# Patient Record
Sex: FEMALE | Race: WHITE | NOT HISPANIC OR LATINO | Employment: OTHER | URBAN - METROPOLITAN AREA
[De-identification: names, ages, dates, MRNs, and addresses within clinical notes are randomized per-mention and may not be internally consistent; named-entity substitution may affect disease eponyms.]

---

## 2023-07-19 PROBLEM — E03.9 ACQUIRED HYPOTHYROIDISM: Chronic | Status: ACTIVE | Noted: 2023-07-19

## 2023-07-19 PROBLEM — Z91.89 AT RISK FOR INJURY RELATED TO HYPOGLYCEMIA: Chronic | Status: ACTIVE | Noted: 2023-07-19

## 2023-07-19 PROBLEM — E78.2 MIXED HYPERLIPIDEMIA: Chronic | Status: ACTIVE | Noted: 2023-07-19

## 2023-07-19 PROBLEM — N20.0 KIDNEY STONE: Chronic | Status: ACTIVE | Noted: 2023-07-19

## 2023-07-19 PROBLEM — E13.9 DIABETES 1.5, MANAGED AS TYPE 1: Status: ACTIVE | Noted: 2023-07-19

## 2023-07-19 PROBLEM — E10.649 TYPE 1 DIABETES MELLITUS WITH HYPOGLYCEMIA AND WITHOUT COMA: Chronic | Status: ACTIVE | Noted: 2023-07-19

## 2023-07-19 PROBLEM — E10.649 TYPE 1 DIABETES MELLITUS WITH HYPOGLYCEMIA AND WITHOUT COMA: Chronic | Status: RESOLVED | Noted: 2023-07-19 | Resolved: 2023-07-19

## 2023-07-19 PROBLEM — J30.2 SEASONAL ALLERGIES: Chronic | Status: ACTIVE | Noted: 2023-07-19

## 2023-07-19 PROBLEM — I10 BENIGN ESSENTIAL HYPERTENSION: Chronic | Status: ACTIVE | Noted: 2023-07-19

## 2023-07-19 PROBLEM — E10.9 DIABETES MELLITUS TYPE 1: Chronic | Status: ACTIVE | Noted: 2023-07-19

## 2023-07-26 ENCOUNTER — LAB (OUTPATIENT)
Dept: LAB | Facility: HOSPITAL | Age: 73
End: 2023-07-26
Payer: MEDICARE

## 2023-07-26 ENCOUNTER — PATIENT ROUNDING (BHMG ONLY) (OUTPATIENT)
Dept: INTERNAL MEDICINE | Facility: CLINIC | Age: 73
End: 2023-07-26
Payer: MEDICARE

## 2023-07-26 DIAGNOSIS — E78.2 MIXED HYPERLIPIDEMIA: Chronic | ICD-10-CM

## 2023-07-26 DIAGNOSIS — E55.9 VITAMIN D DEFICIENCY: ICD-10-CM

## 2023-07-26 DIAGNOSIS — E03.9 ACQUIRED HYPOTHYROIDISM: Chronic | ICD-10-CM

## 2023-07-26 DIAGNOSIS — E13.9 DIABETES 1.5, MANAGED AS TYPE 1: ICD-10-CM

## 2023-07-26 LAB
25(OH)D3 SERPL-MCNC: 40.5 NG/ML (ref 30–100)
ALBUMIN SERPL-MCNC: 4.1 G/DL (ref 3.5–5.2)
ALBUMIN UR-MCNC: 1.5 MG/DL
ALBUMIN/GLOB SERPL: 1.6 G/DL
ALP SERPL-CCNC: 71 U/L (ref 39–117)
ALT SERPL W P-5'-P-CCNC: 18 U/L (ref 1–33)
ANION GAP SERPL CALCULATED.3IONS-SCNC: 9.8 MMOL/L (ref 5–15)
AST SERPL-CCNC: 34 U/L (ref 1–32)
BACTERIA UR QL AUTO: NORMAL /HPF
BASOPHILS # BLD AUTO: 0.04 10*3/MM3 (ref 0–0.2)
BASOPHILS NFR BLD AUTO: 0.6 % (ref 0–1.5)
BILIRUB SERPL-MCNC: 0.5 MG/DL (ref 0–1.2)
BILIRUB UR QL STRIP: NEGATIVE
BUN SERPL-MCNC: 15 MG/DL (ref 8–23)
BUN/CREAT SERPL: 21.1 (ref 7–25)
CALCIUM SPEC-SCNC: 9.8 MG/DL (ref 8.6–10.5)
CHLORIDE SERPL-SCNC: 102 MMOL/L (ref 98–107)
CHOLEST SERPL-MCNC: 168 MG/DL (ref 0–200)
CLARITY UR: ABNORMAL
CO2 SERPL-SCNC: 26.2 MMOL/L (ref 22–29)
COLOR UR: YELLOW
CREAT SERPL-MCNC: 0.71 MG/DL (ref 0.57–1)
CREAT UR-MCNC: 118.8 MG/DL
DEPRECATED RDW RBC AUTO: 43 FL (ref 37–54)
EGFRCR SERPLBLD CKD-EPI 2021: 89.9 ML/MIN/1.73
EOSINOPHIL # BLD AUTO: 0.1 10*3/MM3 (ref 0–0.4)
EOSINOPHIL NFR BLD AUTO: 1.5 % (ref 0.3–6.2)
ERYTHROCYTE [DISTWIDTH] IN BLOOD BY AUTOMATED COUNT: 11.8 % (ref 12.3–15.4)
GLOBULIN UR ELPH-MCNC: 2.5 GM/DL
GLUCOSE SERPL-MCNC: 124 MG/DL (ref 65–99)
GLUCOSE UR STRIP-MCNC: NEGATIVE MG/DL
HBA1C MFR BLD: 5.7 % (ref 4.8–5.6)
HCT VFR BLD AUTO: 40.4 % (ref 34–46.6)
HCYS SERPL-MCNC: 6.8 UMOL/L (ref 0–15)
HDLC SERPL-MCNC: 86 MG/DL (ref 40–60)
HGB BLD-MCNC: 13.5 G/DL (ref 12–15.9)
HGB UR QL STRIP.AUTO: NEGATIVE
HYALINE CASTS UR QL AUTO: NORMAL /LPF
KETONES UR QL STRIP: NEGATIVE
LDLC SERPL CALC-MCNC: 73 MG/DL (ref 0–100)
LDLC/HDLC SERPL: 0.87 {RATIO}
LEUKOCYTE ESTERASE UR QL STRIP.AUTO: NEGATIVE
LYMPHOCYTES # BLD AUTO: 1.82 10*3/MM3 (ref 0.7–3.1)
LYMPHOCYTES NFR BLD AUTO: 26.6 % (ref 19.6–45.3)
MCH RBC QN AUTO: 33.1 PG (ref 26.6–33)
MCHC RBC AUTO-ENTMCNC: 33.4 G/DL (ref 31.5–35.7)
MCV RBC AUTO: 99 FL (ref 79–97)
MICROALBUMIN/CREAT UR: 12.6 MG/G
MONOCYTES # BLD AUTO: 0.55 10*3/MM3 (ref 0.1–0.9)
MONOCYTES NFR BLD AUTO: 8 % (ref 5–12)
NEUTROPHILS NFR BLD AUTO: 4.31 10*3/MM3 (ref 1.7–7)
NEUTROPHILS NFR BLD AUTO: 63 % (ref 42.7–76)
NITRITE UR QL STRIP: NEGATIVE
PH UR STRIP.AUTO: 5.5 [PH] (ref 5–8)
PLATELET # BLD AUTO: 228 10*3/MM3 (ref 140–450)
PMV BLD AUTO: 10.2 FL (ref 6–12)
POTASSIUM SERPL-SCNC: 4.3 MMOL/L (ref 3.5–5.2)
PROT SERPL-MCNC: 6.6 G/DL (ref 6–8.5)
PROT UR QL STRIP: NEGATIVE
RBC # BLD AUTO: 4.08 10*6/MM3 (ref 3.77–5.28)
RBC # UR STRIP: NORMAL /HPF
REF LAB TEST METHOD: NORMAL
SODIUM SERPL-SCNC: 138 MMOL/L (ref 136–145)
SP GR UR STRIP: 1.02 (ref 1–1.03)
SQUAMOUS #/AREA URNS HPF: NORMAL /HPF
T4 FREE SERPL-MCNC: 1.32 NG/DL (ref 0.93–1.7)
TRIGL SERPL-MCNC: 38 MG/DL (ref 0–150)
TSH SERPL DL<=0.05 MIU/L-ACNC: 2.13 UIU/ML (ref 0.27–4.2)
UROBILINOGEN UR QL STRIP: ABNORMAL
VIT B12 BLD-MCNC: 410 PG/ML (ref 211–946)
VLDLC SERPL-MCNC: 9 MG/DL (ref 5–40)
WBC # UR STRIP: NORMAL /HPF
WBC NRBC COR # BLD: 6.84 10*3/MM3 (ref 3.4–10.8)

## 2023-07-26 PROCEDURE — 82607 VITAMIN B-12: CPT

## 2023-07-26 PROCEDURE — 82043 UR ALBUMIN QUANTITATIVE: CPT

## 2023-07-26 PROCEDURE — 83090 ASSAY OF HOMOCYSTEINE: CPT

## 2023-07-26 PROCEDURE — 84443 ASSAY THYROID STIM HORMONE: CPT

## 2023-07-26 PROCEDURE — 85027 COMPLETE CBC AUTOMATED: CPT

## 2023-07-26 PROCEDURE — 80053 COMPREHEN METABOLIC PANEL: CPT

## 2023-07-26 PROCEDURE — 81001 URINALYSIS AUTO W/SCOPE: CPT

## 2023-07-26 PROCEDURE — 82570 ASSAY OF URINE CREATININE: CPT

## 2023-07-26 PROCEDURE — 82306 VITAMIN D 25 HYDROXY: CPT

## 2023-07-26 PROCEDURE — 80061 LIPID PANEL: CPT

## 2023-07-26 PROCEDURE — 84439 ASSAY OF FREE THYROXINE: CPT

## 2023-07-26 PROCEDURE — 83036 HEMOGLOBIN GLYCOSYLATED A1C: CPT

## 2023-07-28 ENCOUNTER — TELEPHONE (OUTPATIENT)
Dept: INTERNAL MEDICINE | Facility: CLINIC | Age: 73
End: 2023-07-28
Payer: MEDICARE

## 2023-07-28 RX ORDER — ACETAMINOPHEN 160 MG
2000 TABLET,DISINTEGRATING ORAL DAILY
Qty: 30 CAPSULE | Refills: 11
Start: 2023-07-28 | End: 2024-07-27

## 2023-07-28 RX ORDER — CYANOCOBALAMIN (VITAMIN B-12) 500 MCG
500 TABLET ORAL DAILY
Qty: 90 TABLET | Refills: 1
Start: 2023-07-28

## 2023-08-28 ENCOUNTER — TRANSCRIBE ORDERS (OUTPATIENT)
Dept: ADMINISTRATIVE | Facility: HOSPITAL | Age: 73
End: 2023-08-28
Payer: MEDICARE

## 2023-08-28 DIAGNOSIS — Z12.31 VISIT FOR SCREENING MAMMOGRAM: Primary | ICD-10-CM

## 2023-10-04 ENCOUNTER — HOSPITAL ENCOUNTER (OUTPATIENT)
Dept: MAMMOGRAPHY | Facility: HOSPITAL | Age: 73
Discharge: HOME | End: 2023-10-04
Payer: MEDICARE

## 2023-10-04 ENCOUNTER — APPOINTMENT (OUTPATIENT)
Dept: OTHER | Facility: HOSPITAL | Age: 73
End: 2023-10-04
Payer: MEDICARE

## 2023-10-04 DIAGNOSIS — Z12.31 ENCOUNTER FOR SCREENING MAMMOGRAM FOR MALIGNANT NEOPLASM OF BREAST: ICD-10-CM

## 2023-10-04 DIAGNOSIS — Z12.31 VISIT FOR SCREENING MAMMOGRAM: ICD-10-CM

## 2023-10-04 PROCEDURE — 77067 SCR MAMMO BI INCL CAD: CPT

## 2023-10-04 PROCEDURE — 77063 BREAST TOMOSYNTHESIS BI: CPT

## 2023-10-20 ENCOUNTER — OFFICE VISIT (OUTPATIENT)
Dept: INTERNAL MEDICINE | Facility: CLINIC | Age: 73
End: 2023-10-20
Payer: MEDICARE

## 2023-10-20 VITALS
HEART RATE: 79 BPM | BODY MASS INDEX: 19.63 KG/M2 | DIASTOLIC BLOOD PRESSURE: 72 MMHG | OXYGEN SATURATION: 100 % | HEIGHT: 65 IN | TEMPERATURE: 97.7 F | WEIGHT: 117.8 LBS | SYSTOLIC BLOOD PRESSURE: 132 MMHG

## 2023-10-20 DIAGNOSIS — Z91.89 AT RISK FOR INJURY RELATED TO HYPOGLYCEMIA: Chronic | ICD-10-CM

## 2023-10-20 DIAGNOSIS — E78.2 MIXED HYPERLIPIDEMIA: Chronic | ICD-10-CM

## 2023-10-20 DIAGNOSIS — I10 BENIGN ESSENTIAL HYPERTENSION: Primary | Chronic | ICD-10-CM

## 2023-10-20 DIAGNOSIS — H90.3 SENSORINEURAL HEARING LOSS (SNHL) OF BOTH EARS: Chronic | ICD-10-CM

## 2023-10-20 DIAGNOSIS — E03.9 ACQUIRED HYPOTHYROIDISM: Chronic | ICD-10-CM

## 2023-10-20 DIAGNOSIS — E13.9 DIABETES 1.5, MANAGED AS TYPE 1: ICD-10-CM

## 2023-10-20 RX ORDER — SIMVASTATIN 40 MG
40 TABLET ORAL NIGHTLY
Qty: 90 TABLET | Refills: 1 | Status: SHIPPED | OUTPATIENT
Start: 2023-10-20

## 2023-10-20 RX ORDER — LISINOPRIL 5 MG/1
5 TABLET ORAL DAILY
Qty: 90 TABLET | Refills: 1 | Status: SHIPPED | OUTPATIENT
Start: 2023-10-20

## 2023-10-20 RX ORDER — LEVOTHYROXINE SODIUM 0.05 MG/1
50 TABLET ORAL DAILY
Qty: 90 TABLET | Refills: 1 | Status: SHIPPED | OUTPATIENT
Start: 2023-10-20

## 2023-10-20 NOTE — ASSESSMENT & PLAN NOTE
She will continue using the insulin pump. Her last A1c was very good at 5.9 percent. She will go next week by the lab to do another A1c. She will continue regular walking and exercise. Continue low-sugar, low-fat diet.

## 2023-10-20 NOTE — PROGRESS NOTES
[facility]     July Sumner  1950   [id number]      Patient Care Team:  Evangelina Quinn MD as PCP - General (Internal Medicine)    Chief Complaint   Patient presents with    Hypertension            HPI  Patient is a 73 y.o. female presents today for a follow-up visit.    Hypertension  The patient's blood pressure today is 172/68 mmHg. She is monitoring her blood pressure at home and it is usually 118/60 mmHg. The highest she has usually seen it is 128 mmHg systolic. She is taking lisinopril 5 mg in the evening. The patient eats a moderate amount of salt and very little sugar. She is under more stress lately. The patient's blood pressure on recheck is 132/72 mmHg.    Hyperlipidemia  The patient is taking simvastatin in the evening for her cholesterol. She denies any side effects with her simvastatin. Her cholesterol was normal in 07/2023. She walks 3 to 4 miles a day at least 6 days a week.    Hypothyroidism  The patient takes levothyroxine in the morning on an empty stomach. Her energy level is pretty good.    Diabetes  The patient's blood glucose are good. She monitors her blood glucose at home. She states that her A1c is slightly elevated as she was having some low readings at night. She has decreased her basal rate at night so her blood glucose does not drop as much at night. She will get up in the middle of the night to check it. Her A1c at her last visit was 5.7 percent. She mentions that her blood glucose elevates for a couple of days after receiving vaccines. She tries to keep her blood glucose between 100 and 70 mg/dL. If it got down to 68 mg/dL, it wakes her up. She does not have a sensor, but checks her blood glucose 8 times a day. She goes to bed at 10:00 PM and sets her alarm to wake her up at 1:30 AM. At that time, she pricks her finger and if it is slightly elevated, she will adjust her basal rate to receive less insulin, then her blood glucose is usually fine in the  morning. She denies low readings first thing in the morning. By 5:00 PM, it starts to elevate. Her endocrinologist told her that her liver occasionally releases insulin at night, which cause it to drop. She adjusts her basal rate from 12:00 PM to 5:00 PM. The patient saw the eye doctor in 04/2023 in [place], [place]. She has an upcoming appointment in 2024. She has an appointment with the endocrinologist on 01/10/2024.    Right foot pain  The patient reports pain on the dorsal aspect of her right foot, but it improved. She does not think she has a history of diabetic neuropathy. She denes injury. She went to a podiatrist and they provided diabetic shoes. She denies tenderness, but reports that she had to stop wearing the shoes as they caused tenderness for 1 week. She has not had a problem since. She wears athletic shoes when she is exercising. She has some inserts that the podiatrist gave, which are helpful. She denies numbness and tingling.    Hearing loss  The patient complains of hearing loss of bilateral ears. Both ears are the same; no ear is worse than the other. She thinks she needs her hearing checked.  is mumbling or she needs a hearing check. She denies trouble hearing on the phone. She notices that if there is a lot of background noise in a restaurant, it is difficult to hear.     Health maintenance  The patient had a mammogram on 10/12/2023; she has not received the results. She has an appointment with the dermatologist in 2024.     Immunizations  The patient has had the influenza vaccine, RSV, and COVID-19 vaccine. She had the pneumonia vaccine in 07/2023. She is due for a tetanus vaccine.       CHRONIC CONDITIONS      Past Medical History:   Diagnosis Date    Allergic 1971    seasonal    Arthritis 2019    It isn't bad.    Cataract 2019    Yearly exam - no surgery needed yet.    Diabetes mellitus 1997    Type 1, cause is autoimmune disease    Heart murmur 1990    Mild mitral prolapse     HL (hearing loss)     Mild    Hypothyroidism 1987    related to diabetes    Kidney stone 2023    first kidney stone    Osteopenia     mild osteopenia - stable    Visual impairment     no sign of diabetic retinopathy       Past Surgical History:   Procedure Laterality Date    COLON SURGERY  2016    Cecal Volvulus -colon, segmental resection, terminal ileum & cecum    COLONOSCOPY  6/15/2022    No polyps-  said I didn't need another one because of my age (unless I have problems)    KNEE SURGERY Left     bony growth removed-benign    TUBAL ABDOMINAL LIGATION         Family History   Problem Relation Age of Onset    Breast cancer Mother 52    Arthritis Mother         age related    Cancer Mother         Breast cancer at age 50    Mental illness Mother         Alzheimer's disease noticeable when she was in her late 60s    Vision loss Mother         cataracts late in life    Alzheimer's disease Mother     Alcohol abuse Father         alcoholic    Cancer Father         Lung cancer - smoker    Heart disease Father     Hyperlipidemia Father         smoker, over weight    Other Brother         vitiligo    Other Daughter         vitiligo    Hypothyroidism Daughter     Thyroid disease Daughter         Hyperthyroidism    Hyperthyroidism Daughter     Birth defects Son          at birth - diaphragmatic hernia    Diabetes Paternal Grandmother         Type 2 diabetic late in life    Ovarian cancer Neg Hx        Social History     Socioeconomic History    Marital status:    Tobacco Use    Smoking status: Never    Smokeless tobacco: Never   Substance and Sexual Activity    Alcohol use: Yes     Alcohol/week: 7.0 standard drinks of alcohol     Types: 7 Glasses of wine per week     Comment: I have a glass of wine before dinner most nights.    Drug use: Never    Sexual activity: Never       No Known Allergies    Review of Systems:   The appropriate review of systems is included in the  "HPI.    Vital Signs  Vitals:    10/20/23 1618 10/20/23 1642   BP: 172/68 132/72   BP Location: Left arm Left arm   Patient Position: Sitting Sitting   Cuff Size: Adult    Pulse: 79    Temp: 97.7 °F (36.5 °C)    TempSrc: Infrared    SpO2: 100%    Weight: 53.4 kg (117 lb 12.8 oz)    Height: 165.5 cm (65.16\")    PainSc: 0-No pain      Body mass index is 19.51 kg/m².  BMI is within normal parameters. No other follow-up for BMI required.        Current Outpatient Medications:     Calcium Carbonate-Vitamin D 600-5 MG-MCG tablet, Take 1 capsule by mouth Daily., Disp: , Rfl:     Cholecalciferol (Vitamin D3) 50 MCG (2000 UT) capsule, Take 1 capsule by mouth Daily., Disp: 30 capsule, Rfl: 11    cyanocobalamin (VITAMIN B-12) 500 MCG tablet, Take 1 tablet by mouth Daily., Disp: 90 tablet, Rfl: 1    fluticasone (FLONASE) 50 MCG/ACT nasal spray, 2 sprays into the nostril(s) as directed by provider Daily As Needed for Rhinitis (Seasonal allergies)., Disp: , Rfl:     folic acid (FOLVITE) 400 MCG tablet, Take 1 tablet by mouth Daily., Disp: , Rfl:     Glucagon (Baqsimi Two Pack) 3 MG/DOSE powder, 3 mg into the nostril(s) as directed by provider Daily As Needed (hypoglycemia)., Disp: 2 each, Rfl: 5    glucose blood (Accu-Chek Guide) test strip, Checks 7-8 times per day. Uses Contour strips, Disp: 7 each, Rfl: 5    ibuprofen (ADVIL,MOTRIN) 800 MG tablet, Take 1 tablet by mouth Every 8 (Eight) Hours As Needed., Disp: , Rfl:     insulin regular (HumuLIN R) 500 UNIT/ML patient supplied pump, Inject  under the skin into the appropriate area as directed., Disp: , Rfl:     levothyroxine (SYNTHROID, LEVOTHROID) 50 MCG tablet, Take 1 tablet by mouth Daily., Disp: 90 tablet, Rfl: 1    lisinopril (PRINIVIL,ZESTRIL) 5 MG tablet, Take 1 tablet by mouth Daily., Disp: 90 tablet, Rfl: 1    NON FORMULARY, Metronic MiniMed 730g diabetic supplies, Disp: , Rfl:     NovoLOG 100 UNIT/ML injection, Inject 30 Units under the skin into the appropriate area " as directed Daily. Up to 30 units daily, Disp: 3 each, Rfl: 11    simvastatin (ZOCOR) 40 MG tablet, Take 1 tablet by mouth Every Night., Disp: 90 tablet, Rfl: 1    Physical Exam:    Physical Exam  Vitals and nursing note reviewed.   Constitutional:       Appearance: Normal appearance. She is well-developed.   HENT:      Head: Normocephalic.   Eyes:      Conjunctiva/sclera: Conjunctivae normal.      Pupils: Pupils are equal, round, and reactive to light.   Neck:      Thyroid: No thyromegaly.   Cardiovascular:      Rate and Rhythm: Normal rate and regular rhythm.      Heart sounds: Normal heart sounds.      Comments: No edema.  Pulmonary:      Effort: Pulmonary effort is normal.      Breath sounds: Normal breath sounds. No wheezing.   Musculoskeletal:         General: Normal range of motion.      Cervical back: Normal range of motion and neck supple.   Lymphadenopathy:      Cervical: No cervical adenopathy.   Skin:     General: Skin is warm and dry.   Neurological:      Mental Status: She is alert and oriented to person, place, and time.   Psychiatric:         Thought Content: Thought content normal.        ACE III MINI        Results Review:    None    CMP:  Lab Results   Component Value Date    BUN 15 07/26/2023    CREATININE 0.71 07/26/2023    BCR 21.1 07/26/2023     07/26/2023    K 4.3 07/26/2023    CO2 26.2 07/26/2023    CALCIUM 9.8 07/26/2023    ALBUMIN 4.1 07/26/2023    BILITOT 0.5 07/26/2023    ALKPHOS 71 07/26/2023    AST 34 (H) 07/26/2023    ALT 18 07/26/2023     HbA1c:  Lab Results   Component Value Date    HGBA1C 5.50 10/27/2023    HGBA1C 5.70 (H) 07/26/2023     Microalbumin:  Lab Results   Component Value Date    MICROALBUR 1.5 07/26/2023     Lipid Panel  Lab Results   Component Value Date    CHOL 168 07/26/2023    TRIG 38 07/26/2023    HDL 86 (H) 07/26/2023    LDL 73 07/26/2023    AST 34 (H) 07/26/2023    ALT 18 07/26/2023       Medication Review: Medications reviewed and noted  Patient  Instructions   Problem List Items Addressed This Visit          Advance Directives and General Issues    At risk for injury related to hypoglycemia (Chronic)       Cardiac and Vasculature    Mixed hyperlipidemia (Chronic)    Overview     Taking simvastatin every evening.         Current Assessment & Plan     She will continue simvastatin every evening. Continue low-fat diet and regular exercise.         Relevant Medications    simvastatin (ZOCOR) 40 MG tablet    Benign essential hypertension - Primary (Chronic)    Current Assessment & Plan     She will continue to decrease salt in the diet. She will continue lisinopril 5 mg tablet daily. Continue regular walking and exercise.         Relevant Medications    lisinopril (PRINIVIL,ZESTRIL) 5 MG tablet       ENT    Sensorineural hearing loss (SNHL) of both ears (Chronic)    Overview     [facility]         Current Assessment & Plan     I gave her a prescription to go to [facility] for hearing testing.            Endocrine and Metabolic    Acquired hypothyroidism (Chronic)    Current Assessment & Plan     She will continue current dose of levothyroxine daily.         Relevant Medications    levothyroxine (SYNTHROID, LEVOTHROID) 50 MCG tablet    Diabetes 1.5, managed as type 1    Current Assessment & Plan     She will continue using the insulin pump. Her last A1c was very good at 5.9 percent. She will go next week by the lab to do another A1c. She will continue regular walking and exercise. Continue low-sugar, low-fat diet.         Relevant Medications    insulin regular (HumuLIN R) 500 UNIT/ML patient supplied pump    glucose blood (Accu-Chek Guide) test strip    NovoLOG 100 UNIT/ML injection    Glucagon (Baqsimi Two Pack) 3 MG/DOSE powder    Other Relevant Orders    Hemoglobin A1c (Completed)          Diagnosis Plan   1. Benign essential hypertension        2. Mixed hyperlipidemia        3. Diabetes 1.5, managed as type 1  Hemoglobin  A1c      4. At risk for injury related to hypoglycemia        5. Acquired hypothyroidism        6. Sensorineural hearing loss (SNHL) of both ears                Follow up: She will come back in 01/2024 for Medicare annual wellness.    Plan of care reviewed with patient at the conclusion of today's visit. Education was provided regarding diagnosis, management, and any prescribed or recommended OTC medications.Patient verbalizes understanding of and agreement with management plan.         Transcribed from ambient dictation for Evangelina Quinn MD by Hui Zendejas.  10/20/23   19:41 EDT    Patient or patient representative verbalized consent to the visit recording.  I have personally performed the services described in this document as transcribed by the above individual, and it is both accurate and complete.

## 2023-10-20 NOTE — ASSESSMENT & PLAN NOTE
She will continue to decrease salt in the diet. She will continue lisinopril 5 mg tablet daily. Continue regular walking and exercise.

## 2023-10-27 ENCOUNTER — LAB (OUTPATIENT)
Dept: LAB | Facility: HOSPITAL | Age: 73
End: 2023-10-27
Payer: MEDICARE

## 2023-10-27 DIAGNOSIS — E13.9 DIABETES 1.5, MANAGED AS TYPE 1: ICD-10-CM

## 2023-10-27 LAB — HBA1C MFR BLD: 5.5 % (ref 4.8–5.6)

## 2023-10-27 PROCEDURE — 83036 HEMOGLOBIN GLYCOSYLATED A1C: CPT

## 2023-10-29 NOTE — PATIENT INSTRUCTIONS
Patient Instructions  Problem List Items Addressed This Visit          Advance Directives and General Issues    At risk for injury related to hypoglycemia (Chronic)       Cardiac and Vasculature    Mixed hyperlipidemia (Chronic)    Overview     Taking simvastatin every evening.         Current Assessment & Plan     She will continue simvastatin every evening. Continue low-fat diet and regular exercise.         Relevant Medications    simvastatin (ZOCOR) 40 MG tablet    Benign essential hypertension - Primary (Chronic)    Current Assessment & Plan     She will continue to decrease salt in the diet. She will continue lisinopril 5 mg tablet daily. Continue regular walking and exercise.         Relevant Medications    lisinopril (PRINIVIL,ZESTRIL) 5 MG tablet       ENT    Sensorineural hearing loss (SNHL) of both ears (Chronic)    Overview     [facility]         Current Assessment & Plan     I gave her a prescription to go to [facility] for hearing testing.            Endocrine and Metabolic    Acquired hypothyroidism (Chronic)    Current Assessment & Plan     She will continue current dose of levothyroxine daily.         Relevant Medications    levothyroxine (SYNTHROID, LEVOTHROID) 50 MCG tablet    Diabetes 1.5, managed as type 1    Current Assessment & Plan     She will continue using the insulin pump. Her last A1c was very good at 5.9 percent. She will go next week by the lab to do another A1c. She will continue regular walking and exercise. Continue low-sugar, low-fat diet.         Relevant Medications    insulin regular (HumuLIN R) 500 UNIT/ML patient supplied pump    glucose blood (Accu-Chek Guide) test strip    NovoLOG 100 UNIT/ML injection    Glucagon (Baqsimi Two Pack) 3 MG/DOSE powder    Other Relevant Orders    Hemoglobin A1c (Completed)

## 2024-01-10 ENCOUNTER — OFFICE VISIT (OUTPATIENT)
Dept: ENDOCRINOLOGY | Facility: CLINIC | Age: 74
End: 2024-01-10
Payer: MEDICARE

## 2024-01-10 VITALS
HEIGHT: 65 IN | HEART RATE: 66 BPM | OXYGEN SATURATION: 98 % | DIASTOLIC BLOOD PRESSURE: 66 MMHG | SYSTOLIC BLOOD PRESSURE: 134 MMHG | WEIGHT: 117 LBS | BODY MASS INDEX: 19.49 KG/M2

## 2024-01-10 DIAGNOSIS — E03.8 HYPOTHYROIDISM DUE TO HASHIMOTO'S THYROIDITIS: ICD-10-CM

## 2024-01-10 DIAGNOSIS — E13.9 DIABETES 1.5, MANAGED AS TYPE 1: Primary | ICD-10-CM

## 2024-01-10 DIAGNOSIS — E06.3 HYPOTHYROIDISM DUE TO HASHIMOTO'S THYROIDITIS: ICD-10-CM

## 2024-01-10 LAB
EXPIRATION DATE: NORMAL
GLUCOSE BLDC GLUCOMTR-MCNC: 178 MG/DL (ref 70–130)
HBA1C MFR BLD: 5.5 % (ref 4.5–5.7)
Lab: NORMAL

## 2024-01-10 PROCEDURE — 82947 ASSAY GLUCOSE BLOOD QUANT: CPT | Performed by: INTERNAL MEDICINE

## 2024-01-10 PROCEDURE — 3044F HG A1C LEVEL LT 7.0%: CPT | Performed by: INTERNAL MEDICINE

## 2024-01-10 PROCEDURE — 99204 OFFICE O/P NEW MOD 45 MIN: CPT | Performed by: INTERNAL MEDICINE

## 2024-01-10 PROCEDURE — 3078F DIAST BP <80 MM HG: CPT | Performed by: INTERNAL MEDICINE

## 2024-01-10 PROCEDURE — 83036 HEMOGLOBIN GLYCOSYLATED A1C: CPT | Performed by: INTERNAL MEDICINE

## 2024-01-10 PROCEDURE — 3075F SYST BP GE 130 - 139MM HG: CPT | Performed by: INTERNAL MEDICINE

## 2024-01-10 RX ORDER — BLOOD SUGAR DIAGNOSTIC
STRIP MISCELLANEOUS
Qty: 700 EACH | Refills: 3 | Status: SHIPPED | OUTPATIENT
Start: 2024-01-10

## 2024-01-10 RX ORDER — INSULIN ASPART 100 [IU]/ML
INJECTION, SOLUTION INTRAVENOUS; SUBCUTANEOUS
Qty: 4 EACH | Refills: 3 | Status: SHIPPED | OUTPATIENT
Start: 2024-01-10

## 2024-01-10 RX ORDER — PHENOL 1.4 %
600 AEROSOL, SPRAY (ML) MUCOUS MEMBRANE DAILY
COMMUNITY

## 2024-01-10 RX ORDER — LANCETS
8 EACH MISCELLANEOUS DAILY
COMMUNITY

## 2024-01-10 NOTE — PROGRESS NOTES
Chief Complaint   Patient presents with    Diabetes     New Consult        HPI:   July Sumner is a 73 y.o.female sent in consultation by Evangelina Quinn MD for further evaluation and management of her diabetes and hypothyroidism. Her history is as follows:    1) Type 1.5 DM (treated as type 1) with no known associated complications at this time:   - Diagnosed with DM at age 47 (1997) after presenting with weight loss. Found to have CHRISTOPHER.  - started insulin pump in approximately 2008.   - Current Pump: Medtronic 770G w/o CGM. Replacement pump in 05/2023  - Previously managed by Endocrinology in [place], [place] (Dr. Parth Armando)    Current Insulin Pump Settings: (Novolog insulin)  Basal Rates: MN 0.500 u/hr, 6AM 0.650, 9PM 0.550 (total 14.400 units)  I:C Ratio: MN 1 unit : 15 gm  ISF:MN 1 unit : 50 > 100   BG Target:   Active Insulin Time: 4 hours    Glucometer/ Insulin Pump Review:  -  does not use carb wizard, manually enters insulin for food  - majority of pre-meal BGs < 130, occasional post-prandial hypoglycemia  Avg glucose: 118 +/- 41    Avg TDD: 28 units +/- 1.8 units  Avg Basal: 14.3 units (51%)  Avg Bolus: 13.7 units (49%)    DM Health Maintenance:  Ophtho: Last Visit (04/2023) in [place] - no retinopathy  Podiatry: No recent visit  Monofilament / Foot exam: DUE  Lipids: (01/2024) TChol 178, TG 40, HDL 96, LDL 73 - on simvastatin 40 mg q evening  Urine Microalb/Cr ratio: (01/2024) normal  - on lisinopril 5 mg for HTN  CBC: (01/2024) Hgb 13.5  CMP: (01/2024) Cr 0.64, eGFR 92.9, ALT 28, AST 38   Vit B12: (01/2024) 1,311 - on vit B12 500 mcg daily    2) hypothyroidism due to hashimoto's thyroiditis:   - diagnosed in approx 2001    Current Dose: levothyroxine 50 mcg   - Takes in AM on an empty stomach  - no interfering factors  - denies missed doses  - is not on a high dose biotin supplement    Lab Results   Component Value Date    TSH 2.910 01/23/2024     Review of Systems   Constitutional: Negative.     HENT: Negative.  Positive for rhinorrhea, sinus pressure and sneezing.    Eyes: Negative.    Respiratory: Negative.  Positive for cough.    Cardiovascular: Negative.    Gastrointestinal: Negative.    Endocrine: Negative.    Genitourinary: Negative.    Musculoskeletal: Negative.  Positive for arthralgias (feet, hands).   Skin: Negative.    Allergic/Immunologic: Negative.    Neurological: Negative.    Hematological: Negative.    Psychiatric/Behavioral: Negative.       Past Medical History:   Diagnosis Date    Allergic 1971    seasonal    Arthritis 2019    It isn't bad.    Cataract 2019    Yearly exam - no surgery needed yet.    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  Past Surgical History:   Procedure Laterality Date    COLON SURGERY  11/11/2016    Cecal Volvulus -colon, segmental resection, terminal ileum & cecum    COLONOSCOPY  6/15/2022    No polyps-  said I didn't need another one because of my age (unless I have problems)    KNEE SURGERY Left     bony growth removed-benign    TUBAL ABDOMINAL LIGATION  1982     Social History     Tobacco Use    Smoking status: Never    Smokeless tobacco: Never   Vaping Use    Vaping Use:  Never used   Substance Use Topics    Alcohol use: Yes     Alcohol/week: 7.0 standard drinks of alcohol     Types: 7 Glasses of wine per week     Comment: I have a glass of wine before dinner most nights.    Drug use: Never     Outpatient Medications Prior to Visit   Medication Sig Dispense Refill    calcium carbonate (Calcium 600) 600 MG tablet Take 1 tablet by mouth Daily.      Cholecalciferol (Vitamin D3) 50 MCG (2000 UT) capsule Take 1 capsule by mouth Daily. 30 capsule 11    cyanocobalamin (VITAMIN B-12) 500 MCG tablet Take 1 tablet by mouth Daily. 90 tablet 1    fluticasone (FLONASE) 50 MCG/ACT nasal spray 2 sprays into the nostril(s) as directed by provider Daily As Needed for Rhinitis (Seasonal allergies).      folic acid (FOLVITE) 400 MCG tablet Take 1 tablet by mouth Daily.      Glucagon (Baqsimi Two Pack) 3 MG/DOSE powder 3 mg into the nostril(s) as directed by provider Daily As Needed (hypoglycemia). 2 each 5    ibuprofen (ADVIL,MOTRIN) 800 MG tablet Take 1 tablet by mouth Every 8 (Eight) Hours As Needed.      levothyroxine (SYNTHROID, LEVOTHROID) 50 MCG tablet Take 1 tablet by mouth Daily. 90 tablet 1    lisinopril (PRINIVIL,ZESTRIL) 5 MG tablet Take 1 tablet by mouth Daily. 90 tablet 1    Microlet Lancets misc Use 8 each Daily.      NON FORMULARY Metronic MiniMed 730g diabetic supplies      simvastatin (ZOCOR) 40 MG tablet Take 1 tablet by mouth Every Night. 90 tablet 1    glucose blood (Accu-Chek Guide) test strip Checks 7-8 times per day. Uses Contour strips 7 each 5    glucose blood (Contour Next Test) test strip 8 each by Other route Daily. Using both accuchek and contour      NovoLOG 100 UNIT/ML injection Inject 30 Units under the skin into the appropriate area as directed Daily. Up to 30 units daily 3 each 11    Calcium Carbonate-Vitamin D 600-5 MG-MCG tablet Take 1 capsule by mouth Daily.      insulin regular (HumuLIN R) 500 UNIT/ML patient supplied pump Inject  under the skin into the  "appropriate area as directed.       No facility-administered medications prior to visit.     No Known Allergies    /66   Pulse 66   Ht 165.1 cm (65\")   Wt 53.1 kg (117 lb)   SpO2 98%   BMI 19.47 kg/m²   Physical Exam  Vitals reviewed.   Constitutional:       General: She is not in acute distress.     Appearance: She is well-developed. She is not diaphoretic.   HENT:      Head: Normocephalic.   Eyes:      Conjunctiva/sclera: Conjunctivae normal.      Pupils: Pupils are equal, round, and reactive to light.   Neck:      Thyroid: No thyromegaly.      Trachea: No tracheal deviation.      Comments: No palpable thyroid nodules    Cardiovascular:      Rate and Rhythm: Normal rate and regular rhythm.      Heart sounds: Normal heart sounds. No murmur heard.  Pulmonary:      Effort: Pulmonary effort is normal. No respiratory distress.      Breath sounds: Normal breath sounds.   Abdominal:      General: Bowel sounds are normal.      Palpations: Abdomen is soft. There is no mass.      Tenderness: There is no abdominal tenderness.      Comments: No scarring at catheter insertion sites     Lymphadenopathy:      Cervical: No cervical adenopathy.   Skin:     General: Skin is warm and dry.      Findings: No erythema.   Neurological:      Mental Status: She is alert and oriented to person, place, and time.      Cranial Nerves: No cranial nerve deficit.   Psychiatric:         Behavior: Behavior normal.         LABS/IMAGING: outside records reviewed and summarized in HPI  Results for orders placed or performed in visit on 01/10/24   POC Glycosylated Hemoglobin (Hb A1C)    Specimen: Blood   Result Value Ref Range    Hemoglobin A1C 5.5 4.5 - 5.7 %    Lot Number [id number]     Expiration Date 7/30/25    POC Glucose, Blood    Specimen: Blood   Result Value Ref Range    Glucose 178 (A) 70 - 130 mg/dL     Lab Results   Component Value Date    [id number] 1,311 (H) 01/23/2024     Lab Results   Component Value Date    WBC 7.17 " 01/23/2024    HGB 13.5 01/23/2024    HCT 40.3 01/23/2024    MCV 96.4 01/23/2024     01/23/2024     Lab Results   Component Value Date    GLUCOSE 127 (H) 01/23/2024    BUN 15 01/23/2024    CREATININE 0.64 01/23/2024    EGFR 92.9 01/23/2024    BCR 23.4 01/23/2024    K 4.3 01/23/2024    CO2 26.9 01/23/2024    CALCIUM 9.8 01/23/2024    ALBUMIN 4.4 01/23/2024    BILITOT 0.4 01/23/2024    AST 38 (H) 01/23/2024    ALT 28 01/23/2024     Lab Results   Component Value Date    TSH 2.910 01/23/2024     Lab Results   Component Value Date    MICROALBUR <1.2 01/23/2024     Lab Results   Component Value Date    CHOL 178 01/23/2024    TRIG 40 01/23/2024    HDL 96 (H) 01/23/2024    LDL 73 01/23/2024       ASSESSMENT/PLAN:    1) Type 1.5 DM (treated as type 1) with no known associated complications at this time: controlled, A1C% 5.5 today.  - Reviewed current insulin pump settings with patient. Discussed that advantages of using the carb wizard function of the pump to avoid some of the hypoglycemia she experiences instead of manually bolusing insulin for all foods consumed. Also reviewed some of the advantages of CGM devices and some of the new hybrid closed loop pumps available. Pt will not be eligible for a new pump through DME until 2028. She would be eligible for an omnipod 5 pump through her pharmacy benefits. Pt given brochures to review.     For now will continue her current insulin pump settings:   Basal Rates: MN 0.500 u/hr, 6AM 0.650, 9PM 0.550 (total 14.400 units)  I:C Ratio: MN 1 unit : 15 gm  ISF:MN 1 unit : 50 > 100   BG Target:   Active Insulin Time: 4 hours    2) hypothyroidism due to hashimoto's thyroiditis: controlled  TSH collected 01/23/24 was normal at 2.910  - continue levothyroxine 50 mcg qAM  - Reviewed proper thyroid hormone administration, and factors to avoid that decrease medication potency and medication absorption.     RTC 05/20/2024     Electronically Signed: Suzie Ledbetter MD

## 2024-01-23 ENCOUNTER — LAB (OUTPATIENT)
Dept: LAB | Facility: HOSPITAL | Age: 74
End: 2024-01-23
Payer: MEDICARE

## 2024-01-23 DIAGNOSIS — E55.9 VITAMIN D DEFICIENCY: ICD-10-CM

## 2024-01-23 DIAGNOSIS — E13.9 DIABETES 1.5, MANAGED AS TYPE 1: ICD-10-CM

## 2024-01-23 DIAGNOSIS — E06.3 HYPOTHYROIDISM DUE TO HASHIMOTO'S THYROIDITIS: ICD-10-CM

## 2024-01-23 DIAGNOSIS — E03.8 HYPOTHYROIDISM DUE TO HASHIMOTO'S THYROIDITIS: ICD-10-CM

## 2024-01-23 LAB
25(OH)D3 SERPL-MCNC: 53.1 NG/ML (ref 30–100)
ALBUMIN SERPL-MCNC: 4.4 G/DL (ref 3.5–5.2)
ALBUMIN UR-MCNC: <1.2 MG/DL
ALBUMIN/GLOB SERPL: 1.6 G/DL
ALP SERPL-CCNC: 74 U/L (ref 39–117)
ALT SERPL W P-5'-P-CCNC: 28 U/L (ref 1–33)
ANION GAP SERPL CALCULATED.3IONS-SCNC: 11.1 MMOL/L (ref 5–15)
AST SERPL-CCNC: 38 U/L (ref 1–32)
BILIRUB SERPL-MCNC: 0.4 MG/DL (ref 0–1.2)
BUN SERPL-MCNC: 15 MG/DL (ref 8–23)
BUN/CREAT SERPL: 23.4 (ref 7–25)
CALCIUM SPEC-SCNC: 9.8 MG/DL (ref 8.6–10.5)
CHLORIDE SERPL-SCNC: 103 MMOL/L (ref 98–107)
CHOLEST SERPL-MCNC: 178 MG/DL (ref 0–200)
CO2 SERPL-SCNC: 26.9 MMOL/L (ref 22–29)
CREAT SERPL-MCNC: 0.64 MG/DL (ref 0.57–1)
CREAT UR-MCNC: 87.8 MG/DL
DEPRECATED RDW RBC AUTO: 40.9 FL (ref 37–54)
EGFRCR SERPLBLD CKD-EPI 2021: 92.9 ML/MIN/1.73
ERYTHROCYTE [DISTWIDTH] IN BLOOD BY AUTOMATED COUNT: 11.8 % (ref 12.3–15.4)
GLOBULIN UR ELPH-MCNC: 2.7 GM/DL
GLUCOSE SERPL-MCNC: 127 MG/DL (ref 65–99)
HCT VFR BLD AUTO: 40.3 % (ref 34–46.6)
HDLC SERPL-MCNC: 96 MG/DL (ref 40–60)
HGB BLD-MCNC: 13.5 G/DL (ref 12–15.9)
LDLC SERPL CALC-MCNC: 73 MG/DL (ref 0–100)
LDLC/HDLC SERPL: 0.77 {RATIO}
MCH RBC QN AUTO: 32.3 PG (ref 26.6–33)
MCHC RBC AUTO-ENTMCNC: 33.5 G/DL (ref 31.5–35.7)
MCV RBC AUTO: 96.4 FL (ref 79–97)
MICROALBUMIN/CREAT UR: NORMAL MG/G{CREAT}
PLATELET # BLD AUTO: 249 10*3/MM3 (ref 140–450)
PMV BLD AUTO: 10.3 FL (ref 6–12)
POTASSIUM SERPL-SCNC: 4.3 MMOL/L (ref 3.5–5.2)
PROT SERPL-MCNC: 7.1 G/DL (ref 6–8.5)
RBC # BLD AUTO: 4.18 10*6/MM3 (ref 3.77–5.28)
SODIUM SERPL-SCNC: 141 MMOL/L (ref 136–145)
TRIGL SERPL-MCNC: 40 MG/DL (ref 0–150)
TSH SERPL DL<=0.05 MIU/L-ACNC: 2.91 UIU/ML (ref 0.27–4.2)
VIT B12 BLD-MCNC: 1311 PG/ML (ref 211–946)
VLDLC SERPL-MCNC: 9 MG/DL (ref 5–40)
WBC NRBC COR # BLD AUTO: 7.17 10*3/MM3 (ref 3.4–10.8)

## 2024-01-23 PROCEDURE — 82570 ASSAY OF URINE CREATININE: CPT

## 2024-01-23 PROCEDURE — 80061 LIPID PANEL: CPT

## 2024-01-23 PROCEDURE — 84443 ASSAY THYROID STIM HORMONE: CPT

## 2024-01-23 PROCEDURE — 82607 VITAMIN B-12: CPT

## 2024-01-23 PROCEDURE — 82043 UR ALBUMIN QUANTITATIVE: CPT

## 2024-01-23 PROCEDURE — 80053 COMPREHEN METABOLIC PANEL: CPT

## 2024-01-23 PROCEDURE — 85027 COMPLETE CBC AUTOMATED: CPT

## 2024-01-23 PROCEDURE — 82306 VITAMIN D 25 HYDROXY: CPT

## 2024-02-07 ENCOUNTER — OFFICE VISIT (OUTPATIENT)
Dept: INTERNAL MEDICINE | Facility: CLINIC | Age: 74
End: 2024-02-07
Payer: MEDICARE

## 2024-02-07 VITALS
HEIGHT: 64 IN | SYSTOLIC BLOOD PRESSURE: 146 MMHG | TEMPERATURE: 97.5 F | WEIGHT: 119.2 LBS | DIASTOLIC BLOOD PRESSURE: 76 MMHG | HEART RATE: 76 BPM | BODY MASS INDEX: 20.35 KG/M2 | OXYGEN SATURATION: 98 %

## 2024-02-07 DIAGNOSIS — E78.2 MIXED HYPERLIPIDEMIA: Chronic | ICD-10-CM

## 2024-02-07 DIAGNOSIS — Z00.00 MEDICARE ANNUAL WELLNESS VISIT, SUBSEQUENT: Primary | ICD-10-CM

## 2024-02-07 DIAGNOSIS — E13.9 DIABETES 1.5, MANAGED AS TYPE 1: ICD-10-CM

## 2024-02-07 DIAGNOSIS — I45.10 INCOMPLETE RIGHT BUNDLE BRANCH BLOCK: Chronic | ICD-10-CM

## 2024-02-07 DIAGNOSIS — E03.8 HYPOTHYROIDISM DUE TO HASHIMOTO'S THYROIDITIS: ICD-10-CM

## 2024-02-07 DIAGNOSIS — E06.3 HYPOTHYROIDISM DUE TO HASHIMOTO'S THYROIDITIS: ICD-10-CM

## 2024-02-07 DIAGNOSIS — N95.9 MENOPAUSAL AND POSTMENOPAUSAL DISORDER: ICD-10-CM

## 2024-02-07 DIAGNOSIS — I10 BENIGN ESSENTIAL HYPERTENSION: Chronic | ICD-10-CM

## 2024-02-07 RX ORDER — LEVOTHYROXINE SODIUM 0.05 MG/1
50 TABLET ORAL DAILY
Qty: 90 TABLET | Refills: 1 | Status: SHIPPED | OUTPATIENT
Start: 2024-02-07

## 2024-02-07 RX ORDER — CYANOCOBALAMIN (VITAMIN B-12) 500 MCG
TABLET ORAL
Start: 2024-02-07

## 2024-02-07 RX ORDER — LISINOPRIL 5 MG/1
5 TABLET ORAL DAILY
Qty: 90 TABLET | Refills: 1 | Status: SHIPPED | OUTPATIENT
Start: 2024-02-07

## 2024-02-07 RX ORDER — SIMVASTATIN 40 MG
40 TABLET ORAL NIGHTLY
Qty: 90 TABLET | Refills: 1 | Status: SHIPPED | OUTPATIENT
Start: 2024-02-07

## 2024-02-07 NOTE — ASSESSMENT & PLAN NOTE
She will continue current treatment and close follow-up with Dr. Ledbetter. She will continue a low-sugar, low-carbohydrate diet, regular exercise, and walking.

## 2024-02-07 NOTE — PROGRESS NOTES
The ABCs of the Annual Wellness Visit  Initial Medicare Wellness Visit    Chief Complaint   Patient presents with    Medicare Wellness-subsequent     Pt wanted to discuss B12    Hypertension    Med Refill       Subjective   History of Present Illness:  July Sumner is a 73 y.o. female who presents for an Initial Medicare Wellness Visit.    Wellness  The patient has not had any falls. She got all 3 of her words correct.     Elevated blood pressure   Her blood pressure is a little elevated today at 146/70 mmHg. She does check her blood pressure at home. It is usually 118 to 120/60 mmHg at home. The patient will continue on the same dose of lisinopril.     Incomplete right bundle branch block  Her EKG is fine. There is an incomplete right bundle branch block. The patient states she was told a long time ago that she has mild mitral valve prolapse.     Diabetes   She saw Dr. Ledbetter who ordered some labs. The patient will see her in 07/2024. Her blood glucose at the time of draw was 127 mg/dL. Her last hemoglobin A1c was 5.5%. She wears a pump and gets up a couple of times per night to check her blood glucose. The patient does not like the continuous glucose monitors. She occasionally sees low blood glucose, and Dr. Ledbetter suggested she decrease her basal rate when she takes a long walk. The patient denies any side effects with any of her medications.     Elevated B12 level   The patient states she was started on a B12 supplement at her last visit here. Her most recent labs show an elevated B12 level at around 1100. She is reassured an elevated B12 is not worrisome. The patient may decrease her dose to 500 mg on Monday, Wednesday, and Friday    Cholesterol  Her LDL cholesterol is very good at 73 mg/dL and the goal is around 75 mg/dL for people with diabetes. Her HDL is excellent at 96 mg/dL.  She walks for exercise. The patient has joined the [facility].     Vitamin D deficiency   Her vitamin D is good at 53 ng/mL, and she will  continue her same dose.    Weight loss  She has gained back a little weight. The patient weighed 113 pounds in 07/2023. She states she is eating better now and has a good appetite.    Review of labs   White and red blood cells are normal.   Platelets are normal.  The red cell width is a little low, but not a problem.   Creatinine is excellent.   Kidney function is within normal limits.  AST is a little over the normal as it was before.   Other liver enzymes are perfect.   She is not losing too much protein in the urine.  Thyroid is very good.     Foot pain  She states she had some foot pain but stretching before she walks has helped that. The patient has not had pain recently. She denies numbness or tingling. The patient denies swelling in her feet.    Hearing loss   She is trying some new hearing aids. The patient thinks they help. She was told she has moderate hearing loss.    Health maintenance   She will see Dr. Morales, her eye doctor, in 04/2024.  The patient saw the dermatologist and has an appointment to see her again in 1 year.  She gets an annual mammogram which she has had already.   The patient does not need a Pap smear anymore.   She was told she does not need a colonoscopy anymore.  She had no polyps last time.   DEXA scan done in 2022 showed mild osteopenia, which had not changed. She takes calcium and will continue walking. An order is placed for a DEXA scan in 08/2024.  The patient will call a week before she has her other labs drawn in 07/2024 so we can place an order.      CHRONIC CONDITIONS:    HEALTH RISK ASSESSMENT    Recent Hospitalizations:  She was not admitted to the hospital during the last year.       Current Medical Providers:  Patient Care Team:  Evangelina Quinn MD as PCP - General (Internal Medicine)  Suzie Ledbetter MD as Consulting Physician (Endocrinology)  Zuhair Morales MD as Consulting Physician (Ophthalmology)    Smoking Status:  Social History     Tobacco Use   Smoking  Status Never   Smokeless Tobacco Never       Alcohol Consumption:  Social History     Substance and Sexual Activity   Alcohol Use Yes    Alcohol/week: 7.0 standard drinks of alcohol    Types: 7 Glasses of wine per week    Comment: I have a glass of wine before dinner most nights.       Depression Screen:       2024     9:37 AM   PHQ-2/PHQ-9 Depression Screening   Little Interest or Pleasure in Doing Things 0-->not at all   Feeling Down, Depressed or Hopeless 0-->not at all   PHQ-9: Brief Depression Severity Measure Score 0       Fall Risk Screen:  BELIA Fall Risk Assessment was completed, and patient is at LOW risk for falls.Assessment completed on:2024    Health Habits and Functional and Cognitive Screenin/7/2024     9:38 AM   Functional & Cognitive Status   Do you have difficulty preparing food and eating? No   Do you have difficulty bathing yourself, getting dressed or grooming yourself? No   Do you have difficulty using the toilet? No   Do you have difficulty moving around from place to place? No   Do you have trouble with steps or getting out of a bed or a chair? No   Current Diet Well Balanced Diet   Dental Exam Up to date   Eye Exam Up to date   Exercise (times per week) 4 times per week   Current Exercises Include Walking   Do you need help using the phone?  No   Are you deaf or do you have serious difficulty hearing?  No   Do you need help to go to places out of walking distance? No   Do you need help shopping? No   Do you need help preparing meals?  No   Do you need help with housework?  No   Do you need help with laundry? No   Do you need help taking your medications? No   Do you need help managing money? No   Do you ever drive or ride in a car without wearing a seat belt? No   Have you felt unusual stress, anger or loneliness in the last month? No   Who do you live with? Spouse   If you need help, do you have trouble finding someone available to you? No   Have you been bothered in the  last four weeks by sexual problems? No   Do you have difficulty concentrating, remembering or making decisions? No         Age-appropriate Screening Schedule:  Refer to the list below for future screening recommendations based on patient's age, sex and/or medical conditions. Orders for these recommended tests are listed in the plan section. The patient has been provided with a written plan.    Health Maintenance   Topic Date Due    DIABETIC EYE EXAM  04/25/2024    HEMOGLOBIN A1C  07/10/2024    DXA SCAN  08/02/2024    DIABETIC FOOT EXAM  01/10/2025    LIPID PANEL  01/23/2025    URINE MICROALBUMIN  01/23/2025    ANNUAL WELLNESS VISIT  02/07/2025    MAMMOGRAM  10/04/2025    TDAP/TD VACCINES (5 - Td or Tdap) 01/11/2033    HEPATITIS C SCREENING  Completed    COVID-19 Vaccine  Completed    RSV Vaccine - Adults  Completed    INFLUENZA VACCINE  Completed    Pneumococcal Vaccine 65+  Completed    ZOSTER VACCINE  Completed    COLORECTAL CANCER SCREENING  Discontinued        The following portions of the patient's history were reviewed and updated as appropriate: allergies, current medications, past family history, past medical history, past social history, past surgical history, and problem list.    Does the patient have evidence of cognitive impairment? No    Aspirin is not on active medication list.  Aspirin use is not indicated based on review of current medical condition/s. Risk of harm outweighs potential benefits.  .    Outpatient Medications Prior to Visit   Medication Sig Dispense Refill    Accu-Chek Guide test strip Checks 8 times per day 700 each 3    calcium carbonate (Calcium 600) 600 MG tablet Take 1 tablet by mouth Daily.      Cholecalciferol (Vitamin D3) 50 MCG (2000 UT) capsule Take 1 capsule by mouth Daily. 30 capsule 11    fluticasone (FLONASE) 50 MCG/ACT nasal spray 2 sprays into the nostril(s) as directed by provider Daily As Needed for Rhinitis (Seasonal allergies).      folic acid (FOLVITE) 400 MCG  "tablet Take 1 tablet by mouth Daily.      Glucagon (Baqsimi Two Pack) 3 MG/DOSE powder 3 mg into the nostril(s) as directed by provider Daily As Needed (hypoglycemia). 2 each 5    ibuprofen (ADVIL,MOTRIN) 800 MG tablet Take 1 tablet by mouth Every 8 (Eight) Hours As Needed.      Insulin Infusion Pump Supplies (Paradigm Silhouette Combo 23\") misc MFG Part # [id number]-[id number] ([id number])      Microlet Lancets misc Use 8 each Daily.      NON FORMULARY Metronic MiniMed 730g diabetic supplies      NovoLOG 100 UNIT/ML injection Use as directed per insulin pump up to 40 units per day 4 each 3    cyanocobalamin (VITAMIN B-12) 500 MCG tablet Take 1 tablet by mouth Daily. 90 tablet 1    levothyroxine (SYNTHROID, LEVOTHROID) 50 MCG tablet Take 1 tablet by mouth Daily. 90 tablet 1    lisinopril (PRINIVIL,ZESTRIL) 5 MG tablet Take 1 tablet by mouth Daily. 90 tablet 1    simvastatin (ZOCOR) 40 MG tablet Take 1 tablet by mouth Every Night. 90 tablet 1    Insulin Infusion Pump Supplies (PARADIGM RESERVOIR 3ML) [facility] Part # [id number]-[id number] ([id number])       No facility-administered medications prior to visit.       Patient Active Problem List   Diagnosis    Diabetes 1.5, managed as type 1    Hypothyroidism due to Hashimoto's thyroiditis    Kidney stone    Seasonal allergies    Mixed hyperlipidemia    At risk for injury related to hypoglycemia    Benign essential hypertension    Sensorineural hearing loss (SNHL) of both ears    Incomplete right bundle branch block       Advanced Care Planning:  Advance Directive is on file.  ACP discussion was held with the patient during this visit. Patient has an advance directive in EMR which is still valid.     Review of Systems  The appropriate review of systems is included in the HPI.     Compared to one year ago, the patient feels her physical health is the same.  Compared to one year ago, the patient feels her mental health is the same.    Reviewed chart for potential of high risk medication in the elderly: " "yes  Reviewed chart for potential of harmful drug interactions in the elderly:yes    Objective         Vitals:    02/07/24 0940   BP: 146/76   BP Location: Left arm   Patient Position: Sitting   Cuff Size: Adult   Pulse: 76   Temp: 97.5 °F (36.4 °C)   TempSrc: Infrared   SpO2: 98%   Weight: 54.1 kg (119 lb 3.2 oz)   Height: 163.8 cm (64.49\")     BMI is within normal parameters. No other follow-up for BMI required.    Body mass index is 20.15 kg/m².  Discussed the patient's BMI with her. The BMI is in the acceptable range.    Physical Exam  Vitals and nursing note reviewed.   Constitutional:       Appearance: She is well-developed.   Eyes:      Conjunctiva/sclera: Conjunctivae normal.      Pupils: Pupils are equal, round, and reactive to light.   Neck:      Thyroid: No thyromegaly.   Cardiovascular:      Rate and Rhythm: Normal rate and regular rhythm.      Heart sounds: Normal heart sounds. No murmur heard.     Comments: No edema.   Pulmonary:      Effort: Pulmonary effort is normal.      Breath sounds: Normal breath sounds. No wheezing.   Chest:   Breasts:     Right: No inverted nipple, mass, nipple discharge, skin change or tenderness.      Left: No inverted nipple, mass, nipple discharge, skin change or tenderness.   Abdominal:      General: Bowel sounds are normal. There is no distension.      Palpations: Abdomen is soft. There is no mass.      Tenderness: There is no abdominal tenderness.   Musculoskeletal:         General: No tenderness. Normal range of motion.      Cervical back: Normal range of motion and neck supple.   Lymphadenopathy:      Cervical: No cervical adenopathy.      Comments: No axillary lymphadenopathy.    Skin:     General: Skin is warm and dry.      Findings: No rash.   Neurological:      Mental Status: She is alert and oriented to person, place, and time.      Cranial Nerves: No cranial nerve deficit.      Sensory: No sensory deficit.      Coordination: Coordination normal.      Gait: Gait " normal.   Psychiatric:         Speech: Speech normal.         Behavior: Behavior normal.         Thought Content: Thought content normal.         Judgment: Judgment normal.           ECG 12 Lead    Date/Time: 2/7/2024 10:29 AM  Performed by: Evangelina Quinn MD    Authorized by: Evangelina Quinn MD  Comparison: compared with previous ECG   Rhythm: sinus rhythm  Rate: normal  BPM: 61  Conduction: incomplete right bundle branch block  ST Segments: ST segments normal  T Waves: T waves normal  QRS axis: normal    Clinical impression: non-specific ECG          Lab Results   Component Value Date    TRIG 40 01/23/2024    HDL 96 (H) 01/23/2024    LDL 73 01/23/2024    VLDL 9 01/23/2024    HGBA1C 5.5 01/10/2024        Assessment & Plan   Medicare Risks and Personalized Health Plan  CMS Preventative Services Quick Reference  Cardiovascular risk  Fall Risk  Osteoporosis Risk    The above risks/problems have been discussed with the patient.  Pertinent information has been shared with the patient in the After Visit Summary.  Follow up plans and orders are seen below in the Assessment/Plan Section.  Patient Instructions   Problem List Items Addressed This Visit          Cardiac and Vasculature    Mixed hyperlipidemia (Chronic)    Overview     Taking simvastatin every evening.         Current Assessment & Plan     Continue simvastatin every evening. Continue low-fat diet and regular exercise.         Relevant Medications    simvastatin (ZOCOR) 40 MG tablet    Benign essential hypertension (Chronic)    Overview     Taking lisinopril.         Current Assessment & Plan     She will continue lisinopril 5 mg daily. Continue to avoid salt in the diet.         Relevant Medications    lisinopril (PRINIVIL,ZESTRIL) 5 MG tablet    Incomplete right bundle branch block (Chronic)    Overview     Patient is asymptomatic.            Endocrine and Metabolic    Diabetes 1.5, managed as type 1    Current Assessment & Plan     She will continue  current treatment and close follow-up with Dr. Ledbetter. She will continue a low-sugar, low-carbohydrate diet, regular exercise, and walking.         Relevant Medications    Glucagon (Baqsimi Two Pack) 3 MG/DOSE powder    Accu-Chek Guide test strip    NovoLOG 100 UNIT/ML injection    Other Relevant Orders    ECG 12 Lead    Hypothyroidism due to Hashimoto's thyroiditis    Current Assessment & Plan     She will continue current dose of levothyroxine daily.         Relevant Medications    ibuprofen (ADVIL,MOTRIN) 800 MG tablet    levothyroxine (SYNTHROID, LEVOTHROID) 50 MCG tablet     Other Visit Diagnoses       Medicare annual wellness visit, subsequent    -  Primary    Menopausal and postmenopausal disorder        Relevant Orders    DEXA Bone Density Axial             Follow Up:  Next Medicare Wellness visit to be scheduled in 1 year.    Return in about 6 months (around 8/7/2024) for recheck fasting.    An After Visit Summary and PPPS were given to the patient.            Follow Up   Return in about 6 months (around 8/7/2024) for recheck fasting.  Patient was given instructions and counseling regarding her condition or for health maintenance advice. Please see specific information pulled into the AVS if appropriate.     Transcribed from ambient dictation for Evangelina Quinn MD by Elise Jovel.  02/07/24   12:38 EST    Patient or patient representative verbalized consent to the visit recording.  I have personally performed the services described in this document as transcribed by the above individual, and it is both accurate and complete.

## 2024-02-08 NOTE — PATIENT INSTRUCTIONS
Patient Instructions  Problem List Items Addressed This Visit          Cardiac and Vasculature    Mixed hyperlipidemia (Chronic)    Overview     Taking simvastatin every evening.         Current Assessment & Plan     Continue simvastatin every evening. Continue low-fat diet and regular exercise.         Relevant Medications    simvastatin (ZOCOR) 40 MG tablet    Benign essential hypertension (Chronic)    Overview     Taking lisinopril.         Current Assessment & Plan     She will continue lisinopril 5 mg daily. Continue to avoid salt in the diet.         Relevant Medications    lisinopril (PRINIVIL,ZESTRIL) 5 MG tablet    Incomplete right bundle branch block (Chronic)    Overview     Patient is asymptomatic.            Endocrine and Metabolic    Diabetes 1.5, managed as type 1    Current Assessment & Plan     She will continue current treatment and close follow-up with Dr. Ledbetter. She will continue a low-sugar, low-carbohydrate diet, regular exercise, and walking.         Relevant Medications    Glucagon (Baqsimi Two Pack) 3 MG/DOSE powder    Accu-Chek Guide test strip    NovoLOG 100 UNIT/ML injection    Other Relevant Orders    ECG 12 Lead    Hypothyroidism due to Hashimoto's thyroiditis    Current Assessment & Plan     She will continue current dose of levothyroxine daily.         Relevant Medications    ibuprofen (ADVIL,MOTRIN) 800 MG tablet    levothyroxine (SYNTHROID, LEVOTHROID) 50 MCG tablet     Other Visit Diagnoses       Medicare annual wellness visit, subsequent    -  Primary    Menopausal and postmenopausal disorder        Relevant Orders    DEXA Bone Density Axial           Exercising to Stay Healthy  To become healthy and stay healthy, it is recommended that you do moderate-intensity and vigorous-intensity exercise. You can tell that you are exercising at a moderate intensity if your heart starts beating faster and you start breathing faster but can still hold a conversation. You can tell that you are  exercising at a vigorous intensity if you are breathing much harder and faster and cannot hold a conversation while exercising.  How can exercise benefit me?  Exercising regularly is important. It has many health benefits, such as:  Improving overall fitness, flexibility, and endurance.  Increasing bone density.  Helping with weight control.  Decreasing body fat.  Increasing muscle strength and endurance.  Reducing stress and tension, anxiety, depression, or anger.  Improving overall health.  What guidelines should I follow while exercising?  Before you start a new exercise program, talk with your health care provider.  Do not exercise so much that you hurt yourself, feel dizzy, or get very short of breath.  Wear comfortable clothes and wear shoes with good support.  Drink plenty of water while you exercise to prevent dehydration or heat stroke.  Work out until your breathing and your heartbeat get faster (moderate intensity).  How often should I exercise?  Choose an activity that you enjoy, and set realistic goals. Your health care provider can help you make an activity plan that is individually designed and works best for you.  Exercise regularly as told by your health care provider. This may include:  Doing strength training two times a week, such as:  Lifting weights.  Using resistance bands.  Push-ups.  Sit-ups.  Yoga.  Doing a certain intensity of exercise for a given amount of time. Choose from these options:  A total of 150 minutes of moderate-intensity exercise every week.  A total of 75 minutes of vigorous-intensity exercise every week.  A mix of moderate-intensity and vigorous-intensity exercise every week.  Children, pregnant women, people who have not exercised regularly, people who are overweight, and older adults may need to talk with a health care provider about what activities are safe to perform. If you have a medical condition, be sure to talk with your health care provider before you start a new  exercise program.  What are some exercise ideas?  Moderate-intensity exercise ideas include:  Walking 1 mile (1.6 km) in about 15 minutes.  Biking.  Hiking.  Golfing.  Dancing.  Water aerobics.  Vigorous-intensity exercise ideas include:  Walking 4.5 miles (7.2 km) or more in about 1 hour.  Jogging or running 5 miles (8 km) in about 1 hour.  Biking 10 miles (16.1 km) or more in about 1 hour.  Lap swimming.  Roller-skating or in-line skating.  Cross-country skiing.  Vigorous competitive sports, such as football, basketball, and soccer.  Jumping rope.  Aerobic dancing.  What are some everyday activities that can help me get exercise?  Yard work, such as:  Pushing a .  Raking and bagging leaves.  Washing your car.  Pushing a stroller.  Shoveling snow.  Gardening.  Washing windows or floors.  How can I be more active in my day-to-day activities?  Use stairs instead of an elevator.  Take a walk during your lunch break.  If you drive, park your car farther away from your work or school.  If you take public transportation, get off one stop early and walk the rest of the way.  Stand up or walk around during all of your indoor phone calls.  Get up, stretch, and walk around every 30 minutes throughout the day.  Enjoy exercise with a friend. Support to continue exercising will help you keep a regular routine of activity.  Where to find more information  You can find more information about exercising to stay healthy from:  [place] Department of Health and Human Services: www.hhs.gov  Centers for Disease Control and Prevention (CDC): www.cdc.gov  Summary  Exercising regularly is important. It will improve your overall fitness, flexibility, and endurance.  Regular exercise will also improve your overall health. It can help you control your weight, reduce stress, and improve your bone density.  Do not exercise so much that you hurt yourself, feel dizzy, or get very short of breath.  Before you start a new exercise program,  talk with your health care provider.  This information is not intended to replace advice given to you by your health care provider. Make sure you discuss any questions you have with your health care provider.  Document Revised: 04/15/2022 Document Reviewed: 04/15/2022  Elsevier Patient Education © 2023 [employer]

## 2024-05-20 ENCOUNTER — OFFICE VISIT (OUTPATIENT)
Dept: ENDOCRINOLOGY | Facility: CLINIC | Age: 74
End: 2024-05-20
Payer: MEDICARE

## 2024-05-20 VITALS
BODY MASS INDEX: 20.42 KG/M2 | SYSTOLIC BLOOD PRESSURE: 148 MMHG | HEART RATE: 65 BPM | WEIGHT: 119.6 LBS | DIASTOLIC BLOOD PRESSURE: 80 MMHG | HEIGHT: 64 IN | OXYGEN SATURATION: 99 %

## 2024-05-20 DIAGNOSIS — E06.3 HYPOTHYROIDISM DUE TO HASHIMOTO'S THYROIDITIS: ICD-10-CM

## 2024-05-20 DIAGNOSIS — E03.8 HYPOTHYROIDISM DUE TO HASHIMOTO'S THYROIDITIS: ICD-10-CM

## 2024-05-20 DIAGNOSIS — E13.9 DIABETES 1.5, MANAGED AS TYPE 1: Primary | ICD-10-CM

## 2024-05-20 LAB
EXPIRATION DATE: ABNORMAL
EXPIRATION DATE: NORMAL
GLUCOSE BLDC GLUCOMTR-MCNC: 85 MG/DL (ref 70–130)
HBA1C MFR BLD: 5.8 % (ref 4.5–5.7)
Lab: ABNORMAL
Lab: NORMAL

## 2024-05-20 PROCEDURE — 3077F SYST BP >= 140 MM HG: CPT | Performed by: INTERNAL MEDICINE

## 2024-05-20 PROCEDURE — 99214 OFFICE O/P EST MOD 30 MIN: CPT | Performed by: INTERNAL MEDICINE

## 2024-05-20 PROCEDURE — 83036 HEMOGLOBIN GLYCOSYLATED A1C: CPT | Performed by: INTERNAL MEDICINE

## 2024-05-20 PROCEDURE — 3044F HG A1C LEVEL LT 7.0%: CPT | Performed by: INTERNAL MEDICINE

## 2024-05-20 PROCEDURE — 82947 ASSAY GLUCOSE BLOOD QUANT: CPT | Performed by: INTERNAL MEDICINE

## 2024-05-20 PROCEDURE — 3079F DIAST BP 80-89 MM HG: CPT | Performed by: INTERNAL MEDICINE

## 2024-05-20 NOTE — PROGRESS NOTES
Chief Complaint   Patient presents with    Diabetes       HPI:   July Sumner is a 73 y.o.female who returns to endocrine clinic for follow-up evaluation and management of her diabetes and hypothyroidism.  Last visit 1/10/2024. Her history is as follows:    Interim Events:   - Pt lowered her overnight basal rates and adjusted her other basal rates a few weeks ago due to hypoglycemia.   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 Found to have CHRISTOPHER.  - started insulin pump in approximately 2008.   - Current Pump: [employer] 770G w/o CGM. Replacement pump in 05/2023  - Previously managed by Endocrinology in [place], [place] (Dr. Parth Armando)    Current Insulin Pump Settings: (Novolog insulin)  Basal Rates: MN 0.400 u/hr, 6AM 0.650, 9PM 0.500 (total 13.650 units)  I:C Ratio: MN 1 unit : 15 gm  ISF:MN 1 unit : 50 > 100   BG Target:   Active Insulin Time: 4 hours    Glucometer/ Insulin Pump Review:  -  does not use carb wizard, manually enters insulin for food  - majority of pre-meal BGs < 130, occasional post-prandial hypoglycemia  - Frequent glucoses 99 or lower  - More hypoglycemia from 40's - 60's in last few days     Avg glucose: 112 +/- 50    Avg TDD: 26.8 units +/- 1.8 units  Avg Basal: 13.7 units (51%)  Avg Bolus: 13.1 units (49%)    DM Health Maintenance:  Ophtho: Last Visit (04/2023) in [place] - no retinopathy  Podiatry: No recent visit  Monofilament / Foot exam: DUE  Lipids: (01/2024) TChol 178, TG 40, HDL 96, LDL 73 - on simvastatin 40 mg q evening  Urine Microalb/Cr ratio: (01/2024) normal  - on lisinopril 5 mg for HTN  CBC: (01/2024) Hgb 13.5  CMP: (01/2024) Cr 0.64, eGFR 92.9, ALT 28, AST 38   Vit B12: (01/2024) 1,311 - on vit B12  "500 mcg daily    2) hypothyroidism due to hashimoto's thyroiditis:   - diagnosed in approx 2001    Current Dose: levothyroxine 50 mcg   - Takes in AM on an empty stomach  - no interfering factors  - denies missed doses  - is not on a high dose biotin supplement    Lab Results   Component Value Date    TSH 2.910 01/23/2024     Review of Systems   Constitutional: Negative.    HENT: Negative.     Eyes: Negative.    Respiratory: Negative.     Cardiovascular: Negative.    Gastrointestinal: Negative.    Endocrine:        See HPI re BGs   Genitourinary: Negative.    Musculoskeletal: Negative.  Positive for arthralgias (feet, hands).   Skin: Negative.    Allergic/Immunologic: Negative.    Neurological: Negative.    Hematological: Negative.    Psychiatric/Behavioral: Negative.       The following portions of the patient's history were reviewed and updated as appropriate: allergies, current medications, past family history, past medical history, past social history, past surgical history and problem list.    /80   Pulse 65   Ht 163.8 cm (64.49\")   Wt 54.3 kg (119 lb 9.6 oz)   SpO2 99%   BMI 20.22 kg/m²   Physical Exam  Vitals reviewed.   Constitutional:       General: She is not in acute distress.     Appearance: She is well-developed. She is not diaphoretic.   HENT:      Head: Normocephalic.   Eyes:      Conjunctiva/sclera: Conjunctivae normal.      Pupils: Pupils are equal, round, and reactive to light.   Neck:      Thyroid: No thyromegaly.      Trachea: No tracheal deviation.      Comments: No palpable thyroid nodules    Cardiovascular:      Rate and Rhythm: Normal rate and regular rhythm.      Heart sounds: Normal heart sounds. No murmur heard.  Pulmonary:      Effort: Pulmonary effort is normal. No respiratory distress.      Breath sounds: Normal breath sounds.   Abdominal:      General: Bowel sounds are normal.      Palpations: Abdomen is soft. There is no mass.      Tenderness: There is no abdominal " tenderness.      Comments: No scarring at catheter insertion sites     Lymphadenopathy:      Cervical: No cervical adenopathy.   Skin:     General: Skin is warm and dry.      Findings: No erythema.   Neurological:      Mental Status: She is alert and oriented to person, place, and time.      Cranial Nerves: No cranial nerve deficit.   Psychiatric:         Behavior: Behavior normal.       LABS/IMAGING: outside records reviewed and summarized in HPI  Results for orders placed or performed in visit on 05/20/24   POC Glucose, Blood    Specimen: Blood   Result Value Ref Range    Glucose 85 70 - 130 mg/dL    Lot Number [id number]     Expiration Date 11/2/2024    POC Glycosylated Hemoglobin (Hb A1C)    Specimen: Blood   Result Value Ref Range    Hemoglobin A1C 5.8 (A) 4.5 - 5.7 %    Lot Number [id number]     Expiration Date 1/3/2026        ASSESSMENT/PLAN:    1) Type 1.5 DM (treated as type 1) with no known associated complications at this time: uncontrolled with hypoglycemia, A1C% 5.8 today.  - Reviewed current insulin pump settings with patient. Of note: Pt adjusts her basal rates on her own.  Discussed again the advantages of using the carb wizard function of the pump to avoid some of the hypoglycemia she experiences instead of manually bolusing insulin for all foods consumed. Again reviewed the advantages of CGM devices and some of the new hybrid closed loop pumps available. Pt will not be eligible for a new pump through DME until 2028. She would be eligible for an omnipod 5 pump through her pharmacy benefits.     Reviewed with patient that hypoglycemia is associated with multiple adverse consequences. In clinical trials and observational studies, severe hypoglycemia is associated with a roughly 1.5- to 6-fold increased risk of cardiovascular events and mortality.  In older adults, severe hypoglycemia has been associated with an increased risk of dementia.    Reviewed risks of developing hypoglycemic unawareness.      For now will continue her current insulin pump settings:   Basal Rates: MN 0.400 u/hr, 6AM 0.650, 9PM 0.500 (total 13.650 units)  I:C Ratio: MN 1 unit : 15 gm  ISF:MN 1 unit : 50 > 100   BG Target:   Active Insulin Time: 4 hours    2) hypothyroidism due to hashimoto's thyroiditis: controlled  TSH collected 01/23/24 was normal at 2.910  - continue levothyroxine 50 mcg qAM  - Reviewed proper thyroid hormone administration, and factors to avoid that decrease medication potency and medication absorption.     RTC 6 months     Electronically Signed: Suzie Ledbetter MD

## 2024-06-24 ENCOUNTER — TELEPHONE (OUTPATIENT)
Dept: ENDOCRINOLOGY | Facility: CLINIC | Age: 74
End: 2024-06-24

## 2024-06-24 NOTE — TELEPHONE ENCOUNTER
Hub staff attempted to follow warm transfer process and was unsuccessful     Caller: July Sumner    Relationship to patient: Self    Best call back number: [phone]    Patient is needing: ASKED THAT WE FAX A CMN FORM TO MEDICARE . PT ADVISED THAT HER PHARMACY [facility] FAXED A CMN FORM TO THE OFFICE TODAY AT 10:30AM AND ON 6/20 AND 6/21 THE PATIENT  WAS ADVISED THAT OUR OFFICE NEVER RECEIVED THE FORM FROM [facility]. THE FORM IS FOR THE PT TO GET HER NovoLOG 100 UNIT/ML injection TO BE COVERED BY MEDICARE. PT ASKED IF WE HAVE A CMN FORM CAN WE PLEASE FILL THE FORM OUT AND INCLUDE THE LAST TIME PT SEEN DR RIDLEY WHICH WAS 5/20 AND LABS FROM 1/23-5/20/24. PT  IS OUT OF MEDICATION AND NEEDS FORM FAXED TO MEDICARE TODAY . WOULD LIKE A CALL BACK ONCE FORM HAS BEEN FAXED

## 2024-08-07 ENCOUNTER — HOSPITAL ENCOUNTER (OUTPATIENT)
Dept: BONE DENSITY | Facility: HOSPITAL | Age: 74
Discharge: HOME | End: 2024-08-07
Admitting: INTERNAL MEDICINE
Payer: MEDICARE

## 2024-08-07 DIAGNOSIS — N95.9 MENOPAUSAL AND POSTMENOPAUSAL DISORDER: ICD-10-CM

## 2024-08-07 PROCEDURE — 77080 DXA BONE DENSITY AXIAL: CPT

## 2024-08-12 RX ORDER — SIMVASTATIN 40 MG
40 TABLET ORAL
Qty: 90 TABLET | Refills: 3 | Status: SHIPPED | OUTPATIENT
Start: 2024-08-12

## 2024-08-12 RX ORDER — LEVOTHYROXINE SODIUM 0.05 MG/1
50 TABLET ORAL DAILY
Qty: 90 TABLET | Refills: 3 | Status: SHIPPED | OUTPATIENT
Start: 2024-08-12

## 2024-08-12 RX ORDER — LISINOPRIL 5 MG/1
5 TABLET ORAL DAILY
Qty: 90 TABLET | Refills: 3 | Status: SHIPPED | OUTPATIENT
Start: 2024-08-12

## 2024-08-15 PROBLEM — M85.89 OSTEOPENIA OF MULTIPLE SITES: Chronic | Status: ACTIVE | Noted: 2024-08-15

## 2024-08-26 ENCOUNTER — OFFICE VISIT (OUTPATIENT)
Dept: INTERNAL MEDICINE | Facility: CLINIC | Age: 74
End: 2024-08-26
Payer: MEDICARE

## 2024-08-26 VITALS
SYSTOLIC BLOOD PRESSURE: 112 MMHG | HEART RATE: 71 BPM | HEIGHT: 64 IN | TEMPERATURE: 98.4 F | BODY MASS INDEX: 20.38 KG/M2 | OXYGEN SATURATION: 99 % | DIASTOLIC BLOOD PRESSURE: 70 MMHG | WEIGHT: 119.4 LBS

## 2024-08-26 DIAGNOSIS — I10 BENIGN ESSENTIAL HYPERTENSION: Primary | Chronic | ICD-10-CM

## 2024-08-26 DIAGNOSIS — E78.2 MIXED HYPERLIPIDEMIA: Chronic | ICD-10-CM

## 2024-08-26 DIAGNOSIS — E13.9 DIABETES 1.5, MANAGED AS TYPE 1: ICD-10-CM

## 2024-08-26 DIAGNOSIS — M85.89 OSTEOPENIA OF MULTIPLE SITES: Chronic | ICD-10-CM

## 2024-08-26 PROBLEM — I70.0 ABDOMINAL AORTIC ATHEROSCLEROSIS: Chronic | Status: ACTIVE | Noted: 2024-08-26

## 2024-08-26 PROCEDURE — 99214 OFFICE O/P EST MOD 30 MIN: CPT | Performed by: INTERNAL MEDICINE

## 2024-08-26 PROCEDURE — 3044F HG A1C LEVEL LT 7.0%: CPT | Performed by: INTERNAL MEDICINE

## 2024-08-26 PROCEDURE — 1159F MED LIST DOCD IN RCRD: CPT | Performed by: INTERNAL MEDICINE

## 2024-08-26 PROCEDURE — 3078F DIAST BP <80 MM HG: CPT | Performed by: INTERNAL MEDICINE

## 2024-08-26 PROCEDURE — 1160F RVW MEDS BY RX/DR IN RCRD: CPT | Performed by: INTERNAL MEDICINE

## 2024-08-26 PROCEDURE — 1126F AMNT PAIN NOTED NONE PRSNT: CPT | Performed by: INTERNAL MEDICINE

## 2024-08-26 PROCEDURE — 3074F SYST BP LT 130 MM HG: CPT | Performed by: INTERNAL MEDICINE

## 2024-08-26 NOTE — PROGRESS NOTES
[facility]     July Sumner  1950   [id number]      Patient Care Team:  Evangelina Quinn MD as PCP - General (Internal Medicine)  Suzie Ledbetter MD as Consulting Physician (Endocrinology)  Zuhair Morales MD as Consulting Physician (Ophthalmology)    Chief Complaint   Patient presents with    6 Month Follow Up    Hypertension        Patient is a 73 y.o. female.   History of Present Illness  The patient is here for a 6-month follow-up.    She has expressed interest in receiving the new influenza, RSV, and COVID-19 vaccines. Her daily routine includes walking approximately 5 miles at a pace of 18 to 19 minutes per mile. She does not engage in any hand weight exercises at home. She reports no side effects from simvastatin. Her TSH levels were within the normal range during her last check. She has experienced a slight weight gain, which she attributes to a previous loss of 20 pounds during a move. She is not experiencing any swelling in her ankles.    She has been managing her type 1 diabetes well, as confirmed by her endocrinologist in [place]. However, Dr. Ledbetter expressed concerns about her [employer] pump and the fact that she uses multiple meters for blood sugar testing. She checks her blood sugar approximately 8 times a day. She occasionally experiences low blood sugar levels but has never required emergency assistance or glucagon administration. She wakes up in the middle of the night to check her blood sugar levels. She has tried using a continuous glucose monitor in the past but found it frustrating. She has also tried the FreeStyle Sera scanner but noticed a consistent discrepancy of about 10 points compared to her regular meter. She is open to seeing Dr. Ledbetter again. She had difficulty obtaining her insulin due to issues with Medicare approval, but this was eventually resolved by her pharmacy.    She saw Dr. Morales, the ophthalmologist, and was told she has early  cataracts but otherwise good. She saw the dermatologist and was told she was good. Her allergies are doing okay with the Flonase spray, but she has not used it much.    IMMUNIZATIONS  She is up to date on her pneumonia vaccine and RSV vaccine.         CHRONIC CONDITIONS      Past Medical History:   Diagnosis Date    Allergic 1971    seasonal    Arthritis 2019    It isn't bad.    Cataract 2019    Yearly exam - no surgery needed yet.    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, managed as type 1 1997    Visual impairment 2023    no sign of diabetic retinopathy       Past Surgical History:   Procedure Laterality Date    COLON SURGERY  11/11/2016    Cecal Volvulus -colon, segmental resection, terminal ileum & cecum    COLONOSCOPY  6/15/2022    No polyps-  said I didn't need another one because of my age (unless I have problems)    KNEE SURGERY Left     bony growth removed-benign    TUBAL ABDOMINAL LIGATION  1982       Family History   Problem Relation Age of Onset    Breast cancer Mother 52    Arthritis Mother         age related    Cancer Mother         Breast cancer at age 50    Mental illness Mother         Alzheimer's disease noticeable when she was in her late 60s    Vision loss Mother         cataracts late in life    Alzheimer's disease Mother     Alcohol abuse Father         alcoholic    Cancer Father         Lung cancer - smoker    Heart disease Father     Hyperlipidemia Father         smoker, over weight    Hypertension Father         smoker & overweight    Hypothyroidism Daughter     Thyroid disease Daughter         hyper    Hyperthyroidism Daughter     Birth defects Son         Diaphragmatic Hernia    Diabetes Paternal Grandmother         Type 2 diabetic late in life    Ovarian cancer Neg Hx   "      Social History     Socioeconomic History    Marital status:    Tobacco Use    Smoking status: Never    Smokeless tobacco: Never   Vaping Use    Vaping status: Never Used   Substance and Sexual Activity    Alcohol use: Yes     Alcohol/week: 7.0 standard drinks of alcohol     Types: 7 Glasses of wine per week     Comment: I have a glass of wine before dinner most nights.    Drug use: Never    Sexual activity: Never       No Known Allergies    Review of Systems:     Review of Systems    Vital Signs  Vitals:    08/26/24 0903   BP: 112/70   BP Location: Left arm   Patient Position: Sitting   Cuff Size: Adult   Pulse: 71   Temp: 98.4 °F (36.9 °C)   SpO2: 99%   Weight: 54.2 kg (119 lb 6.4 oz)   Height: 163.8 cm (64.49\")   PainSc: 0-No pain     Body mass index is 20.19 kg/m².  BMI is within normal parameters. No other follow-up for BMI required.          Current Outpatient Medications:     Accu-Chek Guide test strip, Checks 8 times per day, Disp: 700 each, Rfl: 3    calcium carbonate (Calcium 600) 600 MG tablet, Take 1 tablet by mouth Daily., Disp: , Rfl:     cyanocobalamin (VITAMIN B-12) 500 MCG tablet, Take 1 tab every Mon Wed Friday (Patient taking differently: 1 tablet Every Other Day. Take 1 tab every Mon Wed Friday), Disp: , Rfl:     fluticasone (FLONASE) 50 MCG/ACT nasal spray, 2 sprays into the nostril(s) as directed by provider Daily As Needed for Rhinitis (Seasonal allergies)., Disp: , Rfl:     folic acid (FOLVITE) 400 MCG tablet, Take 1 tablet by mouth Daily., Disp: , Rfl:     Glucagon (Baqsimi Two Pack) 3 MG/DOSE powder, 3 mg into the nostril(s) as directed by provider Daily As Needed (hypoglycemia)., Disp: 2 each, Rfl: 5    ibuprofen (ADVIL,MOTRIN) 800 MG tablet, Take 1 tablet by mouth Every 8 (Eight) Hours As Needed., Disp: , Rfl:     Insulin Infusion Pump Supplies (Paradigm Silhouette Combo 23\") misc, MFG Part # MMT-[id number] ([id number]), Disp: , Rfl:     levothyroxine (SYNTHROID, LEVOTHROID) 50 MCG " tablet, TAKE 1 TABLET BY MOUTH DAILY, Disp: 90 tablet, Rfl: 3    lisinopril (PRINIVIL,ZESTRIL) 5 MG tablet, TAKE 1 TABLET BY MOUTH DAILY, Disp: 90 tablet, Rfl: 3    Microlet Lancets misc, Use 8 each Daily., Disp: , Rfl:     NON FORMULARY, Metronic MiniMed 730g diabetic supplies, Disp: , Rfl:     NovoLOG 100 UNIT/ML injection, Use as directed per insulin pump up to 40 units per day, Disp: 4 each, Rfl: 3    simvastatin (ZOCOR) 40 MG tablet, TAKE 1 TABLET BY MOUTH AT NIGHT, Disp: 90 tablet, Rfl: 3    Physical Exam:    Physical Exam  Vitals and nursing note reviewed.   Constitutional:       Appearance: She is well-developed.   HENT:      Head: Normocephalic.   Eyes:      Conjunctiva/sclera: Conjunctivae normal.      Pupils: Pupils are equal, round, and reactive to light.   Neck:      Thyroid: No thyromegaly.   Cardiovascular:      Rate and Rhythm: Normal rate and regular rhythm.      Pulses:           Posterior tibial pulses are 3+ on the right side and 3+ on the left side.      Heart sounds: Normal heart sounds.   Pulmonary:      Effort: Pulmonary effort is normal.      Breath sounds: Normal breath sounds. No wheezing.   Musculoskeletal:         General: Normal range of motion.      Cervical back: Normal range of motion and neck supple.      Right lower leg: No edema.      Left lower leg: No edema.   Lymphadenopathy:      Cervical: No cervical adenopathy.   Skin:     General: Skin is warm and dry.   Neurological:      Mental Status: She is alert and oriented to person, place, and time.   Psychiatric:         Attention and Perception: Attention normal.         Mood and Affect: Mood normal.         Thought Content: Thought content normal.         Cognition and Memory: Cognition normal.          ACE III MINI        Results Review:    I reviewed the patient's new clinical results.  Results  Laboratory Studies  A1c was 5.8 in May. Average blood sugar is about 124. TSH was normal. Vitamin D level was 53 in January. Urine  protein test was normal. LDL cholesterol was 73.    Imaging  Bone density was similar to previous results.       CMP:  Lab Results   Component Value Date    BUN 15 01/23/2024    CREATININE 0.64 01/23/2024    BCR 23.4 01/23/2024     01/23/2024    K 4.3 01/23/2024    CO2 26.9 01/23/2024    CALCIUM 9.8 01/23/2024    ALBUMIN 4.4 01/23/2024    BILITOT 0.4 01/23/2024    ALKPHOS 74 01/23/2024    AST 38 (H) 01/23/2024    ALT 28 01/23/2024     HbA1c:  Lab Results   Component Value Date    HGBA1C 5.8 (A) 05/20/2024    HGBA1C 5.5 01/10/2024     Microalbumin:  Lab Results   Component Value Date    MICROALBUR <1.2 01/23/2024     Lipid Panel  Lab Results   Component Value Date    CHOL 178 01/23/2024    TRIG 40 01/23/2024    HDL 96 (H) 01/23/2024    LDL 73 01/23/2024    AST 38 (H) 01/23/2024    ALT 28 01/23/2024       Medication Review: Medications reviewed and noted  Patient Instructions   Problem List Items Addressed This Visit          Cardiac and Vasculature    Mixed hyperlipidemia (Chronic)    Overview     Taking simvastatin every evening.         Relevant Medications    simvastatin (ZOCOR) 40 MG tablet    Benign essential hypertension - Primary (Chronic)    Overview     Taking lisinopril.         Relevant Medications    lisinopril (PRINIVIL,ZESTRIL) 5 MG tablet       Endocrine and Metabolic    Diabetes 1.5, managed as type 1    Relevant Medications    Glucagon (Baqsimi Two Pack) 3 MG/DOSE powder    Accu-Chek Guide test strip    NovoLOG 100 UNIT/ML injection       Musculoskeletal and Injuries    Osteopenia of multiple sites (Chronic)    Overview     8/2024 DEXA shows mild to moderate osteopenia with the lowest T-score in the femoral neck at -1.7.  (Osteopenia ranges from -1.1 down to -2.49.  Osteoporosis range is -2.5 or less.)  This seems to be similar results to your 2022 DEXA which showed a lowest T-score in the femoral neck at -1.6 and a T-score in the left forearm radius of -1.9.               Diagnosis Plan   1.  Benign essential hypertension        2. Mixed hyperlipidemia        3. Osteopenia of multiple sites        4. Diabetes 1.5, managed as type 1          Assessment & Plan  1. Benign essential hypertension.  She will continue lisinopril 5 mg daily. Continue low salt diet and regular exercise.    2. Mixed hyperlipidemia.  She will continue simvastatin nightly. Continue low fat diet and regular exercise.    3. Osteopenia.  Her 2024 DEXA is similar to the one 2 years ago. She will continue regular weightbearing exercises with walking daily. Adding small hand weights at home for 5 or 10 minutes a day is encouraged. Classes such as Pilates and [employer] are also good for weightbearing exercises. She will continue taking vitamin D3 and calcium.    4. Type 1 Diabetes Mellitus.  Her A1c in May was 5.8, indicating an average blood sugar of about 124, which is well-managed for her condition. She will continue using the insulin pump and regular exercise. A low sugar, low carbohydrate diet is recommended. She will follow up with Dr. Khloe ahmadi. If there are issues with insulin prescription approvals, she can contact the office for assistance.    5. Allergic Rhinitis.  She reports doing okay with the Flonase spray and has not used it much recently. Continue as needed.    6. Health Maintenance.  She is up to date on the pneumonia vaccine and Tdap. She is advised to get the flu vaccine in early September and the new Covid vaccine later in October. The RSV vaccine is recommended as she will be 74 in October.    Follow-up  The patient will come back to see me in 6 months for an annual wellness visit.         Plan of care reviewed with patient at the conclusion of today's visit. Education was provided regarding diagnosis, management, and any prescribed or recommended OTC medications. Patient verbalizes understanding of and agreement with management plan.         08/26/24   09:05 EDT    Patient or patient representative verbalized consent  for the use of Ambient Listening during the visit with  Evangelina Quinn MD for chart documentation. 8/26/2024  10:04 EDT

## 2024-08-26 NOTE — PATIENT INSTRUCTIONS
Patient Instructions  Problem List Items Addressed This Visit          Cardiac and Vasculature    Mixed hyperlipidemia (Chronic)    Overview     Taking simvastatin every evening.         Relevant Medications    simvastatin (ZOCOR) 40 MG tablet    Benign essential hypertension - Primary (Chronic)    Overview     Taking lisinopril.         Relevant Medications    lisinopril (PRINIVIL,ZESTRIL) 5 MG tablet       Endocrine and Metabolic    Diabetes 1.5, managed as type 1    Relevant Medications    Glucagon (Baqsimi Two Pack) 3 MG/DOSE powder    Accu-Chek Guide test strip    NovoLOG 100 UNIT/ML injection       Musculoskeletal and Injuries    Osteopenia of multiple sites (Chronic)    Overview     8/2024 DEXA shows mild to moderate osteopenia with the lowest T-score in the femoral neck at -1.7.  (Osteopenia ranges from -1.1 down to -2.49.  Osteoporosis range is -2.5 or less.)  This seems to be similar results to your 2022 DEXA which showed a lowest T-score in the femoral neck at -1.6 and a T-score in the left forearm radius of -1.9.            Exercising to Stay Healthy  To become healthy and stay healthy, it is recommended that you do moderate-intensity and vigorous-intensity exercise. You can tell that you are exercising at a moderate intensity if your heart starts beating faster and you start breathing faster but can still hold a conversation. You can tell that you are exercising at a vigorous intensity if you are breathing much harder and faster and cannot hold a conversation while exercising.  How can exercise benefit me?  Exercising regularly is important. It has many health benefits, such as:  Improving overall fitness, flexibility, and endurance.  Increasing bone density.  Helping with weight control.  Decreasing body fat.  Increasing muscle strength and endurance.  Reducing stress and tension, anxiety, depression, or anger.  Improving overall health.  What guidelines should I follow while exercising?  Before you  start a new exercise program, talk with your health care provider.  Do not exercise so much that you hurt yourself, feel dizzy, or get very short of breath.  Wear comfortable clothes and wear shoes with good support.  Drink plenty of water while you exercise to prevent dehydration or heat stroke.  Work out until your breathing and your heartbeat get faster (moderate intensity).  How often should I exercise?  Choose an activity that you enjoy, and set realistic goals. Your health care provider can help you make an activity plan that is individually designed and works best for you.  Exercise regularly as told by your health care provider. This may include:  Doing strength training two times a week, such as:  Lifting weights.  Using resistance bands.  Push-ups.  Sit-ups.  Yoga.  Doing a certain intensity of exercise for a given amount of time. Choose from these options:  A total of 150 minutes of moderate-intensity exercise every week.  A total of 75 minutes of vigorous-intensity exercise every week.  A mix of moderate-intensity and vigorous-intensity exercise every week.  Children, pregnant women, people who have not exercised regularly, people who are overweight, and older adults may need to talk with a health care provider about what activities are safe to perform. If you have a medical condition, be sure to talk with your health care provider before you start a new exercise program.  What are some exercise ideas?  Moderate-intensity exercise ideas include:  Walking 1 mile (1.6 km) in about 15 minutes.  Biking.  Hiking.  Golfing.  Dancing.  Water aerobics.  Vigorous-intensity exercise ideas include:  Walking 4.5 miles (7.2 km) or more in about 1 hour.  Jogging or running 5 miles (8 km) in about 1 hour.  Biking 10 miles (16.1 km) or more in about 1 hour.  Lap swimming.  Roller-skating or in-line skating.  Cross-country skiing.  Vigorous competitive sports, such as football, basketball, and soccer.  Jumping  rope.  Aerobic dancing.  What are some everyday activities that can help me get exercise?  Yard work, such as:  Pushing a .  Raking and bagging leaves.  Washing your car.  Pushing a stroller.  Shoveling snow.  Gardening.  Washing windows or floors.  How can I be more active in my day-to-day activities?  Use stairs instead of an elevator.  Take a walk during your lunch break.  If you drive, park your car farther away from your work or school.  If you take public transportation, get off one stop early and walk the rest of the way.  Stand up or walk around during all of your indoor phone calls.  Get up, stretch, and walk around every 30 minutes throughout the day.  Enjoy exercise with a friend. Support to continue exercising will help you keep a regular routine of activity.  Where to find more information  You can find more information about exercising to stay healthy from:  [place] Department of Health and Human Services: www.hhs.gov  Centers for Disease Control and Prevention (CDC): www.cdc.gov  Summary  Exercising regularly is important. It will improve your overall fitness, flexibility, and endurance.  Regular exercise will also improve your overall health. It can help you control your weight, reduce stress, and improve your bone density.  Do not exercise so much that you hurt yourself, feel dizzy, or get very short of breath.  Before you start a new exercise program, talk with your health care provider.  This information is not intended to replace advice given to you by your health care provider. Make sure you discuss any questions you have with your health care provider.  Document Revised: 04/15/2022 Document Reviewed: 04/15/2022  Elsevier Patient Education © 2023 Elsevier Inc. Fall Prevention in the Home, Adult  Falls can cause injuries and affect people of all ages. There are many simple things that you can do to make your home safe and to help prevent falls.  If you need it, ask for help making these  changes.  What actions can I take to prevent falls?  General information  Use good lighting in all rooms. Make sure to:  Replace any light bulbs that burn out.  Turn on lights if it is dark and use night-lights.  Keep items that you use often in easy-to-reach places. Lower the shelves around your home if needed.  Move furniture so that there are clear paths around it.  Do not keep throw rugs or other things on the floor that can make you trip.  If any of your floors are uneven, fix them.  Add color or contrast paint or tape to clearly warren and help you see:  Grab bars or handrails.  First and last steps of staircases.  Where the edge of each step is.  If you use a ladder or stepladder:  Make sure that it is fully opened. Do not climb a closed ladder.  Make sure the sides of the ladder are locked in place.  Have someone hold the ladder while you use it.  Know where your pets are as you move through your home.  What can I do in the bathroom?         Keep the floor dry. Clean up any water that is on the floor right away.  Remove soap buildup in the bathtub or shower. Buildup makes bathtubs and showers slippery.  Use non-skid mats or decals on the floor of the bathtub or shower.  Attach bath mats securely with double-sided, non-slip rug tape.  If you need to sit down while you are in the shower, use a non-slip stool.  Install grab bars by the toilet and in the bathtub and shower. Do not use towel bars as grab bars.  What can I do in the bedroom?  Make sure that you have a light by your bed that is easy to reach.  Do not use any sheets or blankets on your bed that hang to the floor.  Have a firm bench or chair with side arms that you can use for support when you get dressed.  What can I do in the kitchen?  Clean up any spills right away.  If you need to reach something above you, use a sturdy step stool that has a grab bar.  Keep electrical cables out of the way.  Do not use floor polish or wax that makes floors  slippery.  What can I do with my stairs?  Do not leave anything on the stairs.  Make sure that you have a light switch at the top and the bottom of the stairs. Have them installed if you do not have them.  Make sure that there are handrails on both sides of the stairs. Fix handrails that are broken or loose. Make sure that handrails are as long as the staircases.  Install non-slip stair treads on all stairs in your home if they do not have carpet.  Avoid having throw rugs at the top or bottom of stairs, or secure the rugs with carpet tape to prevent them from moving.  Choose a carpet design that does not hide the edge of steps on the stairs. Make sure that carpet is firmly attached to the stairs. Fix any carpet that is loose or worn.  What can I do on the outside of my home?  Use bright outdoor lighting.  Repair the edges of walkways and driveways and fix any cracks. Clear paths of anything that can make you trip, such as tools or rocks.  Add color or contrast paint or tape to clearly warren and help you see high doorway thresholds.  Trim any bushes or trees on the main path into your home.  Check that handrails are securely fastened and in good repair. Both sides of all steps should have handrails.  Install guardrails along the edges of any raised decks or porches.  Have leaves, snow, and ice cleared regularly. Use sand, salt, or ice melt on walkways during winter months if you live where there is ice and snow.  In the garage, clean up any spills right away, including grease or oil spills.  What other actions can I take?  Review your medicines with your health care provider. Some medicines can make you confused or feel dizzy. This can increase your chance of falling.  Wear closed-toe shoes that fit well and support your feet. Wear shoes that have rubber soles and low heels.  Use a cane, walker, scooter, or crutches that help you move around if needed.  Talk with your provider about other ways that you can decrease  your risk of falls. This may include seeing a physical therapist to learn to do exercises to improve movement and strength.  Where to find more information  Centers for Disease Control and Prevention, [place]: cdc.gov  [place] on Aging: kianna.nih.gov  National [place] on Aging: kianna.nih.gov  Contact a health care provider if:  You are afraid of falling at home.  You feel weak, drowsy, or dizzy at home.  You fall at home.  Get help right away if you:  Lose consciousness or have trouble moving after a fall.  Have a fall that causes a head injury.  These symptoms may be an emergency. Get help right away. Call 911.  Do not wait to see if the symptoms will go away.  Do not drive yourself to the hospital.  This information is not intended to replace advice given to you by your health care provider. Make sure you discuss any questions you have with your health care provider.  Document Revised: 08/21/2023 Document Reviewed: 08/21/2023  [employer] Biomedical Patient Education © 2024 Elsevier Inc.

## 2024-08-30 ENCOUNTER — TELEPHONE (OUTPATIENT)
Dept: INTERNAL MEDICINE | Facility: CLINIC | Age: 74
End: 2024-08-30
Payer: MEDICARE

## 2024-08-30 ENCOUNTER — TRANSCRIBE ORDERS (OUTPATIENT)
Dept: INTERNAL MEDICINE | Facility: CLINIC | Age: 74
End: 2024-08-30
Payer: MEDICARE

## 2024-08-30 ENCOUNTER — TELEPHONE (OUTPATIENT)
Dept: ENDOCRINOLOGY | Facility: CLINIC | Age: 74
End: 2024-08-30
Payer: MEDICARE

## 2024-08-30 DIAGNOSIS — Z12.31 ENCOUNTER FOR SCREENING MAMMOGRAM FOR BREAST CANCER: Primary | ICD-10-CM

## 2024-08-30 DIAGNOSIS — E13.9 DIABETES 1.5, MANAGED AS TYPE 1: ICD-10-CM

## 2024-08-30 NOTE — TELEPHONE ENCOUNTER
Called the pt and she is needing to get an appt earlier than 11-24 with Dr Ledbetter for refills on her insulin and for pump supplies.    She was transferred to the front to be scheduled.

## 2024-08-30 NOTE — TELEPHONE ENCOUNTER
Caller: July Sumner    Relationship: Self    Best call back number: [phone]    Which medication are you concerned about:     NovoLOG 100 UNIT/ML injection       Who prescribed you this medication: DR. SORIA    What are your concerns: PATIENT IS REQUESTING A PRESCRIPTION SENT TO SO IT WILL BE ON FILE WHEN SHE NEEDS IT SINCE SHE HAS TO REFILL MEDICATION BETWEEN DOCTORS. PLEASE ADVISE      [id number] - [facility] [address] - [phone] [facility] [phone]  [phone]

## 2024-08-30 NOTE — TELEPHONE ENCOUNTER
Provider: KHAI    Caller: SCOOBY BARNETT     Relationship to Patient: SELF    Reason for Call: PATIENT NEEDS A CALL BACK TO TALK ABOUT MEDICARE B COMPLIANCE FOR INSULIN. PLEASE CALL AND ADVISE

## 2024-09-23 ENCOUNTER — OFFICE VISIT (OUTPATIENT)
Dept: ENDOCRINOLOGY | Facility: CLINIC | Age: 74
End: 2024-09-23
Payer: MEDICARE

## 2024-09-23 VITALS
OXYGEN SATURATION: 99 % | WEIGHT: 119.2 LBS | HEART RATE: 67 BPM | BODY MASS INDEX: 20.35 KG/M2 | DIASTOLIC BLOOD PRESSURE: 74 MMHG | SYSTOLIC BLOOD PRESSURE: 138 MMHG | HEIGHT: 64 IN

## 2024-09-23 DIAGNOSIS — E13.9 DIABETES 1.5, MANAGED AS TYPE 1: Primary | ICD-10-CM

## 2024-09-23 DIAGNOSIS — E06.3 HYPOTHYROIDISM DUE TO HASHIMOTO'S THYROIDITIS: ICD-10-CM

## 2024-09-23 LAB
EXPIRATION DATE: NORMAL
EXPIRATION DATE: NORMAL
GLUCOSE BLDC GLUCOMTR-MCNC: 89 MG/DL (ref 70–130)
HBA1C MFR BLD: 5.5 % (ref 4.5–5.7)
Lab: NORMAL
Lab: NORMAL

## 2024-09-23 PROCEDURE — 3075F SYST BP GE 130 - 139MM HG: CPT | Performed by: INTERNAL MEDICINE

## 2024-09-23 PROCEDURE — 82947 ASSAY GLUCOSE BLOOD QUANT: CPT | Performed by: INTERNAL MEDICINE

## 2024-09-23 PROCEDURE — 3078F DIAST BP <80 MM HG: CPT | Performed by: INTERNAL MEDICINE

## 2024-09-23 PROCEDURE — 3044F HG A1C LEVEL LT 7.0%: CPT | Performed by: INTERNAL MEDICINE

## 2024-09-23 PROCEDURE — 99213 OFFICE O/P EST LOW 20 MIN: CPT | Performed by: INTERNAL MEDICINE

## 2024-09-23 PROCEDURE — 83036 HEMOGLOBIN GLYCOSYLATED A1C: CPT | Performed by: INTERNAL MEDICINE

## 2024-09-23 RX ORDER — INSULIN ASPART 100 [IU]/ML
INJECTION, SOLUTION INTRAVENOUS; SUBCUTANEOUS
Qty: 4 EACH | Refills: 3 | Status: CANCELLED | OUTPATIENT
Start: 2024-09-23

## 2024-09-23 NOTE — PROGRESS NOTES
Chief Complaint   Patient presents with    Diabetes     Follow-up       HPI:   July Sumner is a 73 y.o.female who returns to endocrine clinic for follow-up evaluation and management of her diabetes and hypothyroidism.  Last visit 05/20/2024. Her history is as follows:    Interim Events:   - Pt has been entering carbs consumed into the carb wizard function on her pump. Less hypoglycemia documented by using the the carb wizard feature and using temp basal function   - She has been entering most glucose readings into her pump    1) Type 1.5 DM (treated as type 1) with no known associated complications at this time:   - Diagnosed with DM at age 47 (1997) after presenting with weight loss. Found to have CHRISTOPHER.  - started insulin pump in approximately 2008.   - Current Pump: gauzztronic 770G w/o CGM. Replacement pump in 05/2023  - Previously managed by Endocrinology in [place], [place] (Dr. Parth Armando)    Current Insulin Pump Settings: (Novolog insulin)  Basal Rates: MN 0.400 u/hr, 6AM 0.650, 9PM 0.500 (total 13.650 units)  I:C Ratio: MN 1 unit : 15 gm  ISF:MN 1 unit : 50 > 100   BG Target:   Active Insulin Time: 4 hours    Glucometer/ Insulin Pump Review:  -  is using carb wizard for carbs consumed  - majority of pre-meal BGs < 120, occasional post-prandial hypoglycemia  - glucoses < 70 or lower occurring less frequently     Avg glucose: 107 +/- 25    Avg TDD: 22.2 units   Avg Basal: 13.4 units (60%)  Avg Bolus: 8.8 units (40%)  Carbs entered: 104 +/- 29 gm    DM Health Maintenance:  Ophtho: Last Visit (04/2023) in [place] - no retinopathy  Podiatry: No recent visit  Monofilament / Foot exam: DUE  Lipids: (01/2024) TChol 178, TG 40, HDL 96, LDL 73 - on simvastatin 40 mg q evening  Urine Microalb/Cr ratio: (01/2024) normal  - on lisinopril 5 mg for HTN  CBC: (01/2024) Hgb 13.5  CMP: (01/2024) Cr 0.64, eGFR 92.9, ALT 28, AST 38   Vit B12: (01/2024) 1,311 - on vit B12 500 mcg daily    2) hypothyroidism due to  "hashimoto's thyroiditis:   - diagnosed in approx 2001    Current Dose: levothyroxine 50 mcg   - Takes in AM on an empty stomach  - no interfering factors  - denies missed doses  - is not on a high dose biotin supplement    Lab Results   Component Value Date    TSH 2.910 01/23/2024     Review of Systems   Constitutional: Negative.    HENT: Negative.     Eyes: Negative.    Respiratory: Negative.     Cardiovascular: Negative.    Gastrointestinal: Negative.    Endocrine:        See HPI re BGs   Genitourinary: Negative.    Musculoskeletal:  Positive for arthralgias (feet, hands).   Skin: Negative.    Allergic/Immunologic: Negative.    Neurological: Negative.    Hematological: Negative.    Psychiatric/Behavioral: Negative.       The following portions of the patient's history were reviewed and updated as appropriate: allergies, current medications, past family history, past medical history, past social history, past surgical history and problem list.    /74   Pulse 67   Ht 163.4 cm (64.32\")   Wt 54.1 kg (119 lb 3.2 oz)   SpO2 99%   BMI 20.26 kg/m²   Physical Exam  Vitals reviewed.   Constitutional:       General: She is not in acute distress.     Appearance: She is well-developed. She is not diaphoretic.   HENT:      Head: Normocephalic.   Eyes:      Conjunctiva/sclera: Conjunctivae normal.      Pupils: Pupils are equal, round, and reactive to light.   Neck:      Thyroid: No thyromegaly.      Trachea: No tracheal deviation.      Comments: No palpable thyroid nodules    Cardiovascular:      Rate and Rhythm: Normal rate and regular rhythm.      Heart sounds: Normal heart sounds. No murmur heard.  Pulmonary:      Effort: Pulmonary effort is normal. No respiratory distress.      Breath sounds: Normal breath sounds.   Abdominal:      General: Bowel sounds are normal.      Palpations: Abdomen is soft. There is no mass.      Tenderness: There is no abdominal tenderness.      Comments: No scarring at catheter insertion " sites     Lymphadenopathy:      Cervical: No cervical adenopathy.   Skin:     General: Skin is warm and dry.      Findings: No erythema.   Neurological:      Mental Status: She is alert and oriented to person, place, and time.      Cranial Nerves: No cranial nerve deficit.   Psychiatric:         Behavior: Behavior normal.       LABS/IMAGING: outside records reviewed and summarized in HPI  Results for orders placed or performed in visit on 09/23/24   POC Glucose, Blood    Specimen: Blood   Result Value Ref Range    Glucose 89 70 - 130 mg/dL    Lot Number [id number]     Expiration Date 4/5/2025    POC Glycosylated Hemoglobin (Hb A1C)    Specimen: Blood   Result Value Ref Range    Hemoglobin A1C 5.5 4.5 - 5.7 %    Lot Number [id number]     Expiration Date 6/10/2026        ASSESSMENT/PLAN:    1) Type 1.5 DM (treated as type 1) with no known associated complications at this time: controlled, much less hypoglycemia after using carb wizard features, temp basal when walking.  A1C% 5.5 today.    - Have discussed the advantages of using the carb wizard function of the pump to lower the risk for hypoglycemia she had been experiencing when manually bolusing insulin for all foods consumed.Have reviewed the advantages of CGM devices and some of the new hybrid closed loop pumps available. Pt will not be eligible for a new pump through DME until 2028. She would be eligible for an omnipod 5 pump through her pharmacy benefits.   - Have reviewed that hypoglycemia is associated with multiple adverse consequences. In clinical trials and observational studies, severe hypoglycemia is associated with a roughly 1.5- to 6-fold increased risk of cardiovascular events and mortality.  In older adults, severe hypoglycemia has been associated with an increased risk of dementia.  - Have reviewed risks of developing hypoglycemic unawareness.     Recommendations for temp basal settings given to patient during exercise    Continue her current insulin  pump settings:   Basal Rates: MN 0.400 u/hr, 6AM 0.650, 9PM 0.500 (total 13.650 units)  I:C Ratio: MN 1 unit : 15 gm  ISF:MN 1 unit : 50 > 100   BG Target:   Active Insulin Time: 4 hours    2) hypothyroidism due to hashimoto's thyroiditis: controlled  TSH collected 01/23/24 was normal at 2.910  - continue levothyroxine 50 mcg qAM  - Reviewed proper thyroid hormone administration, and factors to avoid that decrease medication potency and medication absorption.     RTC 12/30/2024 months     Electronically Signed: Suzie Ledbetter MD

## 2024-10-06 LAB
NCCN CRITERIA FLAG: NORMAL
TYRER CUZICK SCORE: 4.7

## 2024-10-21 ENCOUNTER — HOSPITAL ENCOUNTER (OUTPATIENT)
Dept: MAMMOGRAPHY | Facility: HOSPITAL | Age: 74
Discharge: HOME | End: 2024-10-21
Admitting: INTERNAL MEDICINE
Payer: MEDICARE

## 2024-10-21 DIAGNOSIS — Z12.31 ENCOUNTER FOR SCREENING MAMMOGRAM FOR BREAST CANCER: ICD-10-CM

## 2024-10-21 PROCEDURE — 77067 SCR MAMMO BI INCL CAD: CPT

## 2024-10-21 PROCEDURE — 77063 BREAST TOMOSYNTHESIS BI: CPT

## 2024-10-22 PROCEDURE — 77067 SCR MAMMO BI INCL CAD: CPT | Performed by: RADIOLOGY

## 2024-10-22 PROCEDURE — 77063 BREAST TOMOSYNTHESIS BI: CPT | Performed by: RADIOLOGY

## 2024-12-30 ENCOUNTER — OFFICE VISIT (OUTPATIENT)
Dept: ENDOCRINOLOGY | Facility: CLINIC | Age: 74
End: 2024-12-30
Payer: MEDICARE

## 2024-12-30 VITALS
WEIGHT: 120.4 LBS | HEART RATE: 75 BPM | BODY MASS INDEX: 20.46 KG/M2 | DIASTOLIC BLOOD PRESSURE: 80 MMHG | SYSTOLIC BLOOD PRESSURE: 126 MMHG | OXYGEN SATURATION: 97 %

## 2024-12-30 DIAGNOSIS — Z46.81 INSULIN PUMP TITRATION: ICD-10-CM

## 2024-12-30 DIAGNOSIS — E13.9 DIABETES 1.5, MANAGED AS TYPE 1: Primary | ICD-10-CM

## 2024-12-30 DIAGNOSIS — E06.3 HYPOTHYROIDISM DUE TO HASHIMOTO'S THYROIDITIS: ICD-10-CM

## 2024-12-30 LAB
EXPIRATION DATE: NORMAL
EXPIRATION DATE: NORMAL
GLUCOSE BLDC GLUCOMTR-MCNC: 126 MG/DL (ref 70–130)
HBA1C MFR BLD: 5.7 % (ref 4.5–5.7)
Lab: NORMAL
Lab: NORMAL

## 2024-12-30 PROCEDURE — 3079F DIAST BP 80-89 MM HG: CPT | Performed by: INTERNAL MEDICINE

## 2024-12-30 PROCEDURE — 82947 ASSAY GLUCOSE BLOOD QUANT: CPT | Performed by: INTERNAL MEDICINE

## 2024-12-30 PROCEDURE — 3044F HG A1C LEVEL LT 7.0%: CPT | Performed by: INTERNAL MEDICINE

## 2024-12-30 PROCEDURE — 99214 OFFICE O/P EST MOD 30 MIN: CPT | Performed by: INTERNAL MEDICINE

## 2024-12-30 PROCEDURE — 3074F SYST BP LT 130 MM HG: CPT | Performed by: INTERNAL MEDICINE

## 2024-12-30 PROCEDURE — 83036 HEMOGLOBIN GLYCOSYLATED A1C: CPT | Performed by: INTERNAL MEDICINE

## 2024-12-30 RX ORDER — INSULIN ASPART 100 [IU]/ML
INJECTION, SOLUTION INTRAVENOUS; SUBCUTANEOUS
Qty: 40 ML | Refills: 3 | Status: SHIPPED | OUTPATIENT
Start: 2024-12-30

## 2024-12-30 NOTE — PROGRESS NOTES
Chief Complaint   Patient presents with    Diabetes     Last A1C 5.5 on 9/23/24.        HPI:   July Sumner is a 74 y.o.female who returns to endocrine clinic for follow-up evaluation and management of her diabetes and hypothyroidism.  Last visit 09/23/2024. Her history is as follows:    Interim Events:   - Pt has been entering carbs consumed into the carb wizard function on her pump. Less hypoglycemia documented by using the the carb wizard feature and using temp basal function   - She has been entering most glucose readings into her pump  - pt has been noting more hypoglycemia around 6AM prompting her to have to eat breakfast earlier.     1) Type 1.5 DM (treated as type 1) with no known associated complications at this time:   - Diagnosed with DM at age 47 (1997) after presenting with weight loss. Found to have CHRISTOPHER.  - started insulin pump in approximately 2008.   - Current Pump: STEGOSYSTEMS 770G w/o CGM. Replacement pump in 05/2023  - Previously managed by Endocrinology in [place], [place] (Dr. Parth Armando)    Current Insulin Pump Settings: (Novolog insulin)  Basal Rates: MN 0.400 u/hr, 6AM 0.650, 9PM 0.500 (total 13.650 units)  I:C Ratio: MN 1 unit : 15 gm  ISF:MN 1 unit : 50 > 100   BG Target:   Active Insulin Time: 3:30 hours    Glucometer/ Insulin Pump Review:  -  is using carb wizard for carbs consumed  - majority of pre-meal BGs < 120, occasional post-prandial hypoglycemia  - glucoses < 70 or lower occurring less frequently     Avg glucose: 102 +/- 22    Avg TDD: 23.1 units   Avg Basal: 13.6 units (59%)  Avg Bolus: 9.5 units (41%)  Carbs entered: 136 +/- 34 gm    DM Health Maintenance:  Ophtho: Last Visit (04/2023) in [place] - no retinopathy  Podiatry: No recent visit  Monofilament / Foot exam: DUE  Lipids: (01/2024) TChol 178, TG 40, HDL 96, LDL 73 - on simvastatin 40 mg q evening  Urine Microalb/Cr ratio: (01/2024) normal  - on lisinopril 5 mg for HTN  CBC: (01/2024) Hgb 13.5  CMP: (01/2024) Nestor  0.64, eGFR 92.9, ALT 28, AST 38   Vit B12: (01/2024) 1,311 - on vit B12 500 mcg daily    - Have discussed the advantages of using the carb wizard function of the pump to lower the risk for hypoglycemia she had been experiencing when manually bolusing insulin for all foods consumed.Have reviewed the advantages of CGM devices and some of the new hybrid closed loop pumps available. Pt will not be eligible for a new pump through DME until 2028. She would be eligible for an omnipod 5 pump through her pharmacy benefits.   - Have reviewed that hypoglycemia is associated with multiple adverse consequences. In clinical trials and observational studies, severe hypoglycemia is associated with a roughly 1.5- to 6-fold increased risk of cardiovascular events and mortality.  In older adults, severe hypoglycemia has been associated with an increased risk of dementia.  - Have reviewed risks of developing hypoglycemic unawareness.     2) hypothyroidism due to hashimoto's thyroiditis:   - diagnosed in approx 2001    Current Dose: levothyroxine 50 mcg   - Takes in AM on an empty stomach  - no interfering factors  - denies missed doses  - is not on a high dose biotin supplement    Lab Results   Component Value Date    TSH 2.910 01/23/2024     Review of Systems   Constitutional: Negative.    HENT: Negative.     Eyes: Negative.    Respiratory: Negative.     Cardiovascular: Negative.    Gastrointestinal: Negative.    Endocrine:        See HPI re BGs   Genitourinary: Negative.    Musculoskeletal:  Positive for arthralgias (feet, hands).   Skin: Negative.    Allergic/Immunologic: Negative.    Neurological: Negative.    Hematological: Negative.    Psychiatric/Behavioral: Negative.       The following portions of the patient's history were reviewed and updated as appropriate: allergies, current medications, past family history, past medical history, past social history, past surgical history and problem list.    /80 (BP Location: Left  arm, Patient Position: Sitting)   Pulse 75   Wt 54.6 kg (120 lb 6.4 oz)   SpO2 97%   BMI 20.46 kg/m²   Physical Exam  Vitals reviewed.   Constitutional:       General: She is not in acute distress.     Appearance: She is well-developed. She is not diaphoretic.   HENT:      Head: Normocephalic.   Eyes:      Conjunctiva/sclera: Conjunctivae normal.      Pupils: Pupils are equal, round, and reactive to light.   Neck:      Thyroid: No thyromegaly.      Trachea: No tracheal deviation.      Comments: No palpable thyroid nodules    Cardiovascular:      Rate and Rhythm: Normal rate and regular rhythm.      Heart sounds: Normal heart sounds. No murmur heard.  Pulmonary:      Effort: Pulmonary effort is normal. No respiratory distress.      Breath sounds: Normal breath sounds.   Abdominal:      General: Bowel sounds are normal.      Palpations: Abdomen is soft. There is no mass.      Tenderness: There is no abdominal tenderness.      Comments: No scarring at catheter insertion sites     Lymphadenopathy:      Cervical: No cervical adenopathy.   Skin:     General: Skin is warm and dry.      Findings: No erythema.   Neurological:      Mental Status: She is alert and oriented to person, place, and time.      Cranial Nerves: No cranial nerve deficit.   Psychiatric:         Behavior: Behavior normal.       LABS/IMAGING: outside records reviewed and summarized in HPI  Results for orders placed or performed in visit on 12/30/24   POC Glucose, Blood    Collection Time: 12/30/24  9:42 AM    Specimen: Blood   Result Value Ref Range    Glucose 126 70 - 130 mg/dL    Lot Number [id number]     Expiration Date 8,082,025    POC Glycosylated Hemoglobin (Hb A1C)    Collection Time: 12/30/24  9:43 AM    Specimen: Blood   Result Value Ref Range    Hemoglobin A1C 5.7 4.5 - 5.7 %    Lot Number [id number]     Expiration Date 10,112,026        ASSESSMENT/PLAN:    1) Type 1.5 DM (treated as type 1) with no known associated complications at this  time: controlled, much less hypoglycemia after using carb wizard features, temp basal when walking.  A1C% 5.7 today.    Has been having hypoglycemia around 6 AM. Changed her pump settings to the following:    Basal Rates: MN 0.400 u/hr, *7AM 0.650, 9PM 0.500 (* total 13.400 units)  I:C Ratio: MN 1 unit : 15 gm  ISF:MN 1 unit : 50 > 100   BG Target:   Active Insulin Time: 4 hours    2) hypothyroidism due to hashimoto's thyroiditis: controlled  TSH collected 01/23/24 was normal at 2.910  - continue levothyroxine 50 mcg qAM  - Reviewed proper thyroid hormone administration, and factors to avoid that decrease medication potency and medication absorption.     Ordered DM Health maintenance labs for March 2024 at the same time she will complete labs for her PCP.     RTC 03/31/2025 months     Electronically Signed: Suzie Ledbetter MD

## 2025-02-10 ENCOUNTER — TELEPHONE (OUTPATIENT)
Dept: INTERNAL MEDICINE | Facility: CLINIC | Age: 75
End: 2025-02-10

## 2025-02-10 NOTE — TELEPHONE ENCOUNTER
Caller: July Sumner    Relationship: Self    Best call back number: [phone]       What was the call regarding: PATIENT HAS AN APPOINTMENT ON 3/7/25 WITH DR. SORIA. HER ENDOCRINOLOGIST HAS PUT IN A BUNCH OF LABS FOR HER, BUT SUGGESTED THAT SHE CHECK WITH DR. SORIA TO SEE IF THERE ARE ANY LABS SHE WOULD LIKE TO ADD TO THE LIST BEFORE THE PATIENT GOES TO HAVE HER BLOOD DRAWN. PLEASE CALL PATIENT AND LET HER KNOW IF MORE LABS WILL BE ADDED OR NOT SO SHE CAN KNOW WHEN SHE IS GOOD TO GET THEM DONE. SHE WILL HAVE THE LABS DONE AT [facility].

## 2025-02-10 NOTE — TELEPHONE ENCOUNTER
TALKED WITH PT AND SHE WAS ADVISED THAT PCP IS OUT OF THE OFFICE FOR 3 WEEKS VERBALIZED UNDERSTOOD.I DID MENTION TO HER THAT SHE CAN CALL BACK AFTER 3 WEEKS IF PCP NEEDS TO ADD MORE LABS TO HER ACTIVE LABS. PT UNDERSTOOD STATED SHE WILL DO THAT.

## 2025-02-28 ENCOUNTER — TELEPHONE (OUTPATIENT)
Dept: INTERNAL MEDICINE | Facility: CLINIC | Age: 75
End: 2025-02-28
Payer: MEDICARE

## 2025-02-28 NOTE — TELEPHONE ENCOUNTER
"    Caller: July Sumner \"Jennifer Sumner\"    Relationship: Self    Best call back number: [phone]    What orders are you requesting (i.e. lab or imaging): ORDERS FOR LAB WORK FOR WELLNESS VISIT     In what timeframe would the patient need to come in: BEFORE APPOINTMENT ON 03/07/25    Where will you receive your lab/imaging services: AT [facility] [place]     Additional notes: THE PATIENT WOULD LIKE TO HAVE HER LABS PUT IN FOR HER APPOINTMENT SHE STATES THAT DOCTOR KHAI HAS ALSO ORDERED HER LABS SHE WANTS TO MAKE SURE SHE CAN GET ALL OF THAT AT THE SAME TIME           "

## 2025-03-04 ENCOUNTER — LAB (OUTPATIENT)
Facility: HOSPITAL | Age: 75
End: 2025-03-04
Payer: MEDICARE

## 2025-03-04 DIAGNOSIS — E06.3 HYPOTHYROIDISM DUE TO HASHIMOTO'S THYROIDITIS: ICD-10-CM

## 2025-03-04 DIAGNOSIS — E13.9 DIABETES 1.5, MANAGED AS TYPE 1: ICD-10-CM

## 2025-03-04 LAB
ALBUMIN SERPL-MCNC: 4.2 G/DL (ref 3.5–5.2)
ALBUMIN UR-MCNC: 1.6 MG/DL
ALBUMIN/GLOB SERPL: 1.3 G/DL
ALP SERPL-CCNC: 77 U/L (ref 39–117)
ALT SERPL W P-5'-P-CCNC: 19 U/L (ref 1–33)
ANION GAP SERPL CALCULATED.3IONS-SCNC: 10.1 MMOL/L (ref 5–15)
AST SERPL-CCNC: 32 U/L (ref 1–32)
BILIRUB SERPL-MCNC: 0.5 MG/DL (ref 0–1.2)
BUN SERPL-MCNC: 13 MG/DL (ref 8–23)
BUN/CREAT SERPL: 18.3 (ref 7–25)
CALCIUM SPEC-SCNC: 10.1 MG/DL (ref 8.6–10.5)
CHLORIDE SERPL-SCNC: 104 MMOL/L (ref 98–107)
CHOLEST SERPL-MCNC: 172 MG/DL (ref 0–200)
CO2 SERPL-SCNC: 26.9 MMOL/L (ref 22–29)
CREAT SERPL-MCNC: 0.71 MG/DL (ref 0.57–1)
CREAT UR-MCNC: 119.2 MG/DL
DEPRECATED RDW RBC AUTO: 44.5 FL (ref 37–54)
EGFRCR SERPLBLD CKD-EPI 2021: 88.8 ML/MIN/1.73
ERYTHROCYTE [DISTWIDTH] IN BLOOD BY AUTOMATED COUNT: 11.9 % (ref 12.3–15.4)
GLOBULIN UR ELPH-MCNC: 3.2 GM/DL
GLUCOSE SERPL-MCNC: 118 MG/DL (ref 65–99)
HCT VFR BLD AUTO: 41.6 % (ref 34–46.6)
HDLC SERPL-MCNC: 97 MG/DL (ref 40–60)
HGB BLD-MCNC: 13.6 G/DL (ref 12–15.9)
LDLC SERPL CALC-MCNC: 66 MG/DL (ref 0–100)
LDLC/HDLC SERPL: 0.69 {RATIO}
MCH RBC QN AUTO: 32.8 PG (ref 26.6–33)
MCHC RBC AUTO-ENTMCNC: 32.7 G/DL (ref 31.5–35.7)
MCV RBC AUTO: 100.2 FL (ref 79–97)
MICROALBUMIN/CREAT UR: 13.4 MG/G (ref 0–29)
PLATELET # BLD AUTO: 229 10*3/MM3 (ref 140–450)
PMV BLD AUTO: 10.5 FL (ref 6–12)
POTASSIUM SERPL-SCNC: 4.8 MMOL/L (ref 3.5–5.2)
PROT SERPL-MCNC: 7.4 G/DL (ref 6–8.5)
RBC # BLD AUTO: 4.15 10*6/MM3 (ref 3.77–5.28)
SODIUM SERPL-SCNC: 141 MMOL/L (ref 136–145)
TRIGL SERPL-MCNC: 42 MG/DL (ref 0–150)
TSH SERPL DL<=0.05 MIU/L-ACNC: 2.55 UIU/ML (ref 0.27–4.2)
VIT B12 BLD-MCNC: 890 PG/ML (ref 211–946)
VLDLC SERPL-MCNC: 9 MG/DL (ref 5–40)
WBC NRBC COR # BLD AUTO: 7.27 10*3/MM3 (ref 3.4–10.8)

## 2025-03-04 PROCEDURE — 82043 UR ALBUMIN QUANTITATIVE: CPT

## 2025-03-04 PROCEDURE — 80053 COMPREHEN METABOLIC PANEL: CPT

## 2025-03-04 PROCEDURE — 82607 VITAMIN B-12: CPT

## 2025-03-04 PROCEDURE — 84443 ASSAY THYROID STIM HORMONE: CPT

## 2025-03-04 PROCEDURE — 80061 LIPID PANEL: CPT

## 2025-03-04 PROCEDURE — 82570 ASSAY OF URINE CREATININE: CPT

## 2025-03-04 PROCEDURE — 85027 COMPLETE CBC AUTOMATED: CPT

## 2025-03-07 ENCOUNTER — OFFICE VISIT (OUTPATIENT)
Dept: INTERNAL MEDICINE | Facility: CLINIC | Age: 75
End: 2025-03-07
Payer: MEDICARE

## 2025-03-07 VITALS
OXYGEN SATURATION: 98 % | HEIGHT: 64 IN | BODY MASS INDEX: 20.79 KG/M2 | TEMPERATURE: 97.8 F | DIASTOLIC BLOOD PRESSURE: 72 MMHG | HEART RATE: 60 BPM | WEIGHT: 121.8 LBS | SYSTOLIC BLOOD PRESSURE: 136 MMHG

## 2025-03-07 DIAGNOSIS — I10 BENIGN ESSENTIAL HYPERTENSION: Chronic | ICD-10-CM

## 2025-03-07 DIAGNOSIS — M54.50 CHRONIC RIGHT-SIDED LOW BACK PAIN WITHOUT SCIATICA: Chronic | ICD-10-CM

## 2025-03-07 DIAGNOSIS — E06.3 HYPOTHYROIDISM DUE TO HASHIMOTO'S THYROIDITIS: ICD-10-CM

## 2025-03-07 DIAGNOSIS — E13.9 DIABETES 1.5, MANAGED AS TYPE 1: ICD-10-CM

## 2025-03-07 DIAGNOSIS — G89.29 CHRONIC RIGHT-SIDED LOW BACK PAIN WITHOUT SCIATICA: Chronic | ICD-10-CM

## 2025-03-07 DIAGNOSIS — M85.89 OSTEOPENIA OF MULTIPLE SITES: Chronic | ICD-10-CM

## 2025-03-07 DIAGNOSIS — E78.2 MIXED HYPERLIPIDEMIA: Chronic | ICD-10-CM

## 2025-03-07 DIAGNOSIS — Z00.00 MEDICARE ANNUAL WELLNESS VISIT, SUBSEQUENT: Primary | ICD-10-CM

## 2025-03-07 RX ORDER — LISINOPRIL 5 MG/1
5 TABLET ORAL DAILY
Qty: 90 TABLET | Refills: 3 | Status: SHIPPED | OUTPATIENT
Start: 2025-03-07

## 2025-03-07 NOTE — PROGRESS NOTES
" Subjective   The ABCs of the Annual Wellness Visit  Medicare Wellness Visit      July Sumner is a 74 y.o. patient who presents for a Medicare Wellness Visit.    The following portions of the patient's history were reviewed and   updated as appropriate: allergies, current medications, past family history, past medical history, past social history, past surgical history, and problem list.    Compared to one year ago, the patient's physical   health is the same.  Compared to one year ago, the patient's mental   health is the same.    Recent Hospitalizations:  She was not admitted to the hospital during the last year.     Current Medical Providers:  Patient Care Team:  Evangelina Quinn MD as PCP - General (Internal Medicine)  Suzie Ledbetter MD as Consulting Physician (Endocrinology)  Zuhair Morales MD as Consulting Physician (Ophthalmology)    Outpatient Medications Prior to Visit   Medication Sig Dispense Refill    calcium carbonate (Calcium 600) 600 MG tablet Take 1 tablet by mouth Daily.      cyanocobalamin (VITAMIN B-12) 500 MCG tablet Take 1 tab every Mon Wed Friday (Patient taking differently: 1 tablet Every Other Day. Take 1 tab every Mon Wed Friday)      fluticasone (FLONASE) 50 MCG/ACT nasal spray Administer 2 sprays into the nostril(s) as directed by provider Daily As Needed for Rhinitis (Seasonal allergies).      folic acid (FOLVITE) 400 MCG tablet Take 1 tablet by mouth Daily.      Glucagon (Baqsimi Two Pack) 3 MG/DOSE powder 3 mg into the nostril(s) as directed by provider Daily As Needed (hypoglycemia). 2 each 5    glucose blood (Accu-Chek Guide) test strip Checks 8 times per day. Accu-chek guide tests strips 700 each 3    ibuprofen (ADVIL,MOTRIN) 800 MG tablet Take 1 tablet by mouth Every 8 (Eight) Hours As Needed.      Insulin Infusion Pump Supplies (Paradigm Silhouette Combo 23\") misc MFG Part # [id number] ([id number])      levothyroxine (SYNTHROID, LEVOTHROID) 50 MCG tablet TAKE 1 TABLET BY " "MOUTH DAILY 90 tablet 3    Microlet Lancets misc Use 8 each Daily.      NON FORMULARY Metronic MiniMed 730g diabetic supplies      NovoLOG 100 UNIT/ML injection Use as directed per insulin pump up to 40 units per day 40 mL 3    simvastatin (ZOCOR) 40 MG tablet TAKE 1 TABLET BY MOUTH AT NIGHT 90 tablet 3    lisinopril (PRINIVIL,ZESTRIL) 5 MG tablet TAKE 1 TABLET BY MOUTH DAILY 90 tablet 3     No facility-administered medications prior to visit.     No opioid medication identified on active medication list. I have reviewed chart for other potential  high risk medication/s and harmful drug interactions in the elderly.      Aspirin is not on active medication list.  Aspirin use is not indicated based on review of current medical condition/s. Risk of harm outweighs potential benefits.  .    Patient Active Problem List   Diagnosis    Diabetes 1.5, managed as type 1    Hypothyroidism due to Hashimoto's thyroiditis    Kidney stone    Seasonal allergies    Mixed hyperlipidemia    At risk for injury related to hypoglycemia    Benign essential hypertension    Sensorineural hearing loss (SNHL) of both ears    Incomplete right bundle branch block    Osteopenia of multiple sites    Abdominal aortic atherosclerosis    Chronic right-sided low back pain without sciatica     Advance Care Planning Advance Directive is on file.  ACP discussion was held with the patient during this visit. Patient has an advance directive in EMR which is still valid.             Objective   Vitals:    03/07/25 0952   BP: 136/72   BP Location: Left arm   Patient Position: Sitting   Cuff Size: Adult   Pulse: 60   Temp: 97.8 °F (36.6 °C)   TempSrc: Infrared   SpO2: 98%   Weight: 55.2 kg (121 lb 12.8 oz)   Height: 163.4 cm (64.33\")   PainSc: 0-No pain       Estimated body mass index is 20.69 kg/m² as calculated from the following:    Height as of this encounter: 163.4 cm (64.33\").    Weight as of this encounter: 55.2 kg (121 lb 12.8 oz).    BMI is within " normal parameters. No other follow-up for BMI required.           Does the patient have evidence of cognitive impairment? No  Lab Results   Component Value Date    TRIG 42 2025    HDL 97 (H) 2025    LDL 66 2025    VLDL 9 2025    HGBA1C 5.7 2024       ECG 12 Lead    Date/Time: 3/7/2025 10:43 AM  Performed by: Evangelina Quinn MD    Authorized by: Evangelina Quinn MD  Comparison: compared with previous ECG   Similar to previous ECG  Rhythm: sinus rhythm  Ectopy: infrequent PVCs  Rate: normal  BPM: 71  Conduction: incomplete left bundle branch block  ST Segments: ST segments normal  T Waves: T waves normal  QRS axis: normal    Clinical impression: abnormal EKG                                                                                                Health  Risk Assessment    Smoking Status:  Social History     Tobacco Use   Smoking Status Never   Smokeless Tobacco Never     Alcohol Consumption:  Social History     Substance and Sexual Activity   Alcohol Use Yes    Alcohol/week: 7.0 standard drinks of alcohol    Types: 7 Glasses of wine per week    Comment: I have a glass of wine before dinner most nights.       Fall Risk Screen  STEADI Fall Risk Assessment was completed, and patient is at LOW risk for falls.Assessment completed on:3/7/2025    Depression Screening   Little interest or pleasure in doing things? Not at all   Feeling down, depressed, or hopeless? Not at all   PHQ-2 Total Score 0      Health Habits and Functional and Cognitive Screenin/28/2025     4:10 PM   Functional & Cognitive Status   Do you have difficulty preparing food and eating? No    Do you have difficulty bathing yourself, getting dressed or grooming yourself? No    Do you have difficulty using the toilet? No    Do you have difficulty moving around from place to place? No    Do you have trouble with steps or getting out of a bed or a chair? No    Current Diet Diabetic Diet    Dental Exam Up to date     Eye Exam Up to date    Exercise (times per week) 5 times per week    Current Exercises Include House Cleaning;Walking    Do you need help using the phone?  No    Are you deaf or do you have serious difficulty hearing?  No    Do you need help to go to places out of walking distance? No    Do you need help shopping? No    Do you need help preparing meals?  No    Do you need help with housework?  No    Do you need help with laundry? No    Do you need help taking your medications? No    Do you need help managing money? No    Do you ever drive or ride in a car without wearing a seat belt? No    Have you felt unusual stress, anger or loneliness in the last month? No    Who do you live with? Spouse    If you need help, do you have trouble finding someone available to you? No    Have you been bothered in the last four weeks by sexual problems? No    Do you have difficulty concentrating, remembering or making decisions? No        Patient-reported           Age-appropriate Screening Schedule:  Refer to the list below for future screening recommendations based on patient's age, sex and/or medical conditions. Orders for these recommended tests are listed in the plan section. The patient has been provided with a written plan.    Health Maintenance List  Health Maintenance   Topic Date Due    DIABETIC FOOT EXAM  01/10/2025    ANNUAL WELLNESS VISIT  02/07/2025    DIABETIC EYE EXAM  04/30/2025    HEMOGLOBIN A1C  06/30/2025    LIPID PANEL  03/04/2026    URINE MICROALBUMIN-CREATININE RATIO (uACR)  03/04/2026    DXA SCAN  08/07/2026    TDAP/TD VACCINES (5 - Td or Tdap) 01/11/2033    HEPATITIS C SCREENING  Completed    COVID-19 Vaccine  Completed    RSV Vaccine - Adults  Completed    INFLUENZA VACCINE  Completed    Pneumococcal Vaccine 50+  Completed    ZOSTER VACCINE  Completed    MAMMOGRAM  Discontinued    COLORECTAL CANCER SCREENING  Discontinued                                                                                                                                                 CMS Preventative Services Quick Reference  Risk Factors Identified During Encounter  Fall Risk-High or Moderate: Discussed Fall Prevention in the home and Information on Fall Prevention Shared in After Visit Summary    The above risks/problems have been discussed with the patient.  Pertinent information has been shared with the patient in the After Visit Summary.  An After Visit Summary and PPPS were made available to the patient.         The patient is a 74-year-old female who presents for evaluation of back pain, diabetes, osteopenia, and health maintenance.    She reports experiencing intermittent back pain, which typically resolves spontaneously. The pain is not present today. She is uncertain about the triggers for her back pain but notes that it tends to occur when she is unable to engage in outdoor walking. She occasionally utilizes the track at the [facility] for exercise but finds that indoor walking exacerbates her discomfort, possibly due to the slope of the track. The pain is localized to the right lower back and does not radiate down her leg. She does not perform any specific back stretches at home. Prolonged sitting is uncomfortable for her, and she prefers to stand. She initially suspected a urinary tract infection (UTI) as the cause of her symptoms, but the pain only persisted for a few days. She has attempted to manage her pain with Tylenol Extra Strength, which her  uses, but does not take it consistently due to her other medications.    Her blood glucose levels typically range between 68 and 70 upon waking, but can increase to 125 within an hour if she does not eat or administer insulin. She monitors her blood glucose levels 6 to 7 times daily, including during the night. She is under the regular care of Dr. Ledbetter for her diabetes management. She has observed that physical activity, such as walking, aids in controlling her blood glucose  "levels. She has noticed occasional air bubbles in her tubing, which can cause elevated readings.    She has been diagnosed with mild osteopenia, which has remained stable over the past decade since she began walking 3 to 4 miles daily. She takes calcium supplements most days.    She does not experience any chest pain, shortness of breath, or pedal edema. Her home blood pressure readings have been within normal limits, although she has not been monitoring it frequently. She reports no side effects from simvastatin. She has recently initiated dermatology consultations. She continues to undergo annual mammograms.    MEDICATIONS  Current: Simvastatin, lisinopril, levothyroxine, B12, calcium    IMMUNIZATIONS  She is up to date on all her immunizations.          Objective   Vital Signs:  /72 (BP Location: Left arm, Patient Position: Sitting, Cuff Size: Adult)   Pulse 60   Temp 97.8 °F (36.6 °C) (Infrared)   Ht 163.4 cm (64.33\")   Wt 55.2 kg (121 lb 12.8 oz)   SpO2 98%   BMI 20.69 kg/m²   Physical Exam  Vitals and nursing note reviewed.   Constitutional:       Appearance: She is well-developed.   HENT:      Head: Normocephalic.   Eyes:      Conjunctiva/sclera: Conjunctivae normal.      Pupils: Pupils are equal, round, and reactive to light.   Neck:      Thyroid: No thyromegaly.   Cardiovascular:      Rate and Rhythm: Normal rate and regular rhythm.      Heart sounds: Normal heart sounds.   Pulmonary:      Effort: Pulmonary effort is normal.      Breath sounds: Normal breath sounds. No wheezing.   Chest:   Breasts:     Right: No inverted nipple, mass, nipple discharge, skin change or tenderness.      Left: No inverted nipple, mass, nipple discharge, skin change or tenderness.   Abdominal:      General: Bowel sounds are normal.      Palpations: Abdomen is soft.      Tenderness: There is no abdominal tenderness.   Musculoskeletal:         General: No tenderness. Normal range of motion.      Cervical back: Normal " range of motion and neck supple.   Lymphadenopathy:      Cervical: No cervical adenopathy.      Upper Body:      Right upper body: No supraclavicular, axillary or pectoral adenopathy.      Left upper body: No supraclavicular, axillary or pectoral adenopathy.   Skin:     General: Skin is warm and dry.      Findings: No rash.   Neurological:      Mental Status: She is alert and oriented to person, place, and time.      Cranial Nerves: No cranial nerve deficit.      Sensory: No sensory deficit.      Coordination: Coordination normal.      Gait: Gait normal.   Psychiatric:         Attention and Perception: Attention normal.         Mood and Affect: Mood normal.         Speech: Speech normal.         Behavior: Behavior normal.         Thought Content: Thought content normal.         Cognition and Memory: Cognition normal.         Judgment: Judgment normal.             Vital Signs  Blood pressure is 136 systolic.    The following data was reviewed by: Evangelina Quinn MD on 03/07/2025:  CMP          3/4/2025    08:07   CMP   Glucose 118    BUN 13    Creatinine 0.71    EGFR 88.8    Sodium 141    Potassium 4.8    Chloride 104    Calcium 10.1    Total Protein 7.4    Albumin 4.2    Globulin 3.2    Total Bilirubin 0.5    Alkaline Phosphatase 77    AST (SGOT) 32    ALT (SGPT) 19    Albumin/Globulin Ratio 1.3    BUN/Creatinine Ratio 18.3    Anion Gap 10.1      CBC          3/4/2025    08:07   CBC   WBC 7.27    RBC 4.15    Hemoglobin 13.6    Hematocrit 41.6    .2    MCH 32.8    MCHC 32.7    RDW 11.9    Platelets 229      CBC w/diff          3/4/2025    08:07   CBC w/Diff   WBC 7.27    RBC 4.15    Hemoglobin 13.6    Hematocrit 41.6    .2    MCH 32.8    MCHC 32.7    RDW 11.9    Platelets 229      Lipid Panel          3/4/2025    08:07   Lipid Panel   Total Cholesterol 172    Triglycerides 42    HDL Cholesterol 97    VLDL Cholesterol 9    LDL Cholesterol  66    LDL/HDL Ratio 0.69      TSH          3/4/2025    08:07    TSH   TSH 2.550      A1C Last 3 Results          5/20/2024    08:23 9/23/2024    12:17 12/30/2024    09:43   HGBA1C Last 3 Results   Hemoglobin A1C 5.8  5.5  5.7        Results  Laboratory Studies  A1c was good in December. Red and white blood cell counts were normal. Blood sugar was slightly high at 118. Kidney function was excellent. Liver enzymes were all normal. LDL cholesterol was 66. HDL cholesterol was high. Microalbumin creatinine ratio was 13. B12 was good. TSH was good.    Testing  EKG showed an early beat and an incomplete right bundle branch block, which has not changed from previous ones.              Assessment and Plan Additional age appropriate preventative wellness advice topics were discussed during today's preventative wellness exam(some topics already addressed during AWV portion of the note above):    Physical Activity: Advised cardiovascular activity 150 minutes per week as tolerated. (example brisk walk for 30 minutes, 5 days a week).     Nutrition: Discussed nutrition plan with patient. Information shared in after visit summary. Goal is for a well balanced diet to enhance overall health.     Healthy Weight: Discussed current and goal BMI with patient. Steps to attain this goal discussed. Information shared in after visit summary.       Back pain.  The etiology of the pain appears to be musculoskeletal in nature. She has been provided with a set of back stretches to perform at home, which should help alleviate the discomfort. She is advised to continue her walking regimen and to incorporate the prescribed back exercises into her routine. If the pain persists or worsens, a referral for physical therapy will be considered. She can use Tylenol Extra Strength as needed for pain relief.    Benign essential hypertension  Continue taking low-dose lisinopril daily.  Continue to avoid salt in the diet.    Mixed hyperlipidemia  Continue simvastatin nightly.  Continue to eat a low-fat healthy diet.   Continue regular walking and exercise.    Hypothyroidism  Continue current dose of levothyroxine daily.    Osteopenia.  Her T score in the femoral neck and hip has remained stable at -1.7. She is advised to continue her walking regimen and to take calcium and vitamin D supplements.  Using small hand weights at home 5 or 10 minutes a day is also helpful for maintaining bone density.    Diabetes mellitus 1.5.  Her blood sugar was slightly high at 118, but she reports that her morning readings are usually between 68-70. She checks her blood sugar 6-7 times a day, including during the night. She is seeing Dr. Ledbetter regularly. She is advised to continue her current regimen and to maintain her exercise routine, as it helps in moderating her blood sugar levels.      Health maintenance.  Her immunizations are up-to-date. She has an appointment with her ophthalmologist scheduled for April. She has recently seen a dermatologist. She continues to get a mammogram every year. She is advised to continue her current medications, including simvastatin and lisinopril. Ref[facility] for simvastatin [facility]. She is also taking levothyroxine and B12 supplements .    Follow-up  The patient will follow up in 6 months.            Follow Up   Return in about 6 months (around 9/7/2025) for recheck fasting.  Patient was given instructions and counseling regarding her condition or for health maintenance advice. Please see specific information pulled into the AVS if appropriate.  Patient or patient representative verbalized consent for the use of Ambient Listening during the visit with  Evangelina Quinn MD for chart documentation. 3/7/2025  10:55 EST

## 2025-03-07 NOTE — PATIENT INSTRUCTIONS
Problem List Items Addressed This Visit          Cardiac and Vasculature    Mixed hyperlipidemia (Chronic)    Overview     Taking simvastatin every evening.         Relevant Medications    simvastatin (ZOCOR) 40 MG tablet    Benign essential hypertension (Chronic)    Overview     Taking lisinopril.         Relevant Medications    lisinopril (PRINIVIL,ZESTRIL) 5 MG tablet       Endocrine and Metabolic    Diabetes 1.5, managed as type 1    Relevant Medications    Glucagon (Baqsimi Two Pack) 3 MG/DOSE powder    NovoLOG 100 UNIT/ML injection    glucose blood (Accu-Chek Guide) test strip    Other Relevant Orders    ECG 12 Lead    Hypothyroidism due to Hashimoto's thyroiditis    Relevant Medications    ibuprofen (ADVIL,MOTRIN) 800 MG tablet    levothyroxine (SYNTHROID, LEVOTHROID) 50 MCG tablet       Musculoskeletal and Injuries    Osteopenia of multiple sites (Chronic)    Overview     8/2024 DEXA shows mild to moderate osteopenia with the lowest T-score in the femoral neck at -1.7.  (Osteopenia ranges from -1.1 down to -2.49.  Osteoporosis range is -2.5 or less.)  This seems to be similar results to your 2022 DEXA which showed a lowest T-score in the femoral neck at -1.6 and a T-score in the left forearm radius of -1.9.         Chronic right-sided low back pain without sciatica (Chronic)     Other Visit Diagnoses       Medicare annual wellness visit, subsequent    -  Primary          Exercising to Stay Healthy  To become healthy and stay healthy, it is recommended that you do moderate-intensity and vigorous-intensity exercise. You can tell that you are exercising at a moderate intensity if your heart starts beating faster and you start breathing faster but can still hold a conversation. You can tell that you are exercising at a vigorous intensity if you are breathing much harder and faster and cannot hold a conversation while exercising.  How can exercise benefit me?  Exercising regularly is important. It has many  health benefits, such as:  Improving overall fitness, flexibility, and endurance.  Increasing bone density.  Helping with weight control.  Decreasing body fat.  Increasing muscle strength and endurance.  Reducing stress and tension, anxiety, depression, or anger.  Improving overall health.  What guidelines should I follow while exercising?  Before you start a new exercise program, talk with your health care provider.  Do not exercise so much that you hurt yourself, feel dizzy, or get very short of breath.  Wear comfortable clothes and wear shoes with good support.  Drink plenty of water while you exercise to prevent dehydration or heat stroke.  Work out until your breathing and your heartbeat get faster (moderate intensity).  How often should I exercise?  Choose an activity that you enjoy, and set realistic goals. Your health care provider can help you make an activity plan that is individually designed and works best for you.  Exercise regularly as told by your health care provider. This may include:  Doing strength training two times a week, such as:  Lifting weights.  Using resistance bands.  Push-ups.  Sit-ups.  Yoga.  Doing a certain intensity of exercise for a given amount of time. Choose from these options:  A total of 150 minutes of moderate-intensity exercise every week.  A total of 75 minutes of vigorous-intensity exercise every week.  A mix of moderate-intensity and vigorous-intensity exercise every week.  Children, pregnant women, people who have not exercised regularly, people who are overweight, and older adults may need to talk with a health care provider about what activities are safe to perform. If you have a medical condition, be sure to talk with your health care provider before you start a new exercise program.  What are some exercise ideas?  Moderate-intensity exercise ideas include:  Walking 1 mile (1.6 km) in about 15 minutes.  Biking.  Hiking.  Golfing.  Dancing.  Water  aerobics.  Vigorous-intensity exercise ideas include:  Walking 4.5 miles (7.2 km) or more in about 1 hour.  Jogging or running 5 miles (8 km) in about 1 hour.  Biking 10 miles (16.1 km) or more in about 1 hour.  Lap swimming.  Roller-skating or in-line skating.  Cross-country skiing.  Vigorous competitive sports, such as football, basketball, and soccer.  Jumping rope.  Aerobic dancing.  What are some everyday activities that can help me get exercise?  Yard work, such as:  Pushing a .  Raking and bagging leaves.  Washing your car.  Pushing a stroller.  Shoveling snow.  Gardening.  Washing windows or floors.  How can I be more active in my day-to-day activities?  Use stairs instead of an elevator.  Take a walk during your lunch break.  If you drive, park your car farther away from your work or school.  If you take public transportation, get off one stop early and walk the rest of the way.  Stand up or walk around during all of your indoor phone calls.  Get up, stretch, and walk around every 30 minutes throughout the day.  Enjoy exercise with a friend. Support to continue exercising will help you keep a regular routine of activity.  Where to find more information  You can find more information about exercising to stay healthy from:  [place].S[place] Department of Health and Human Services: www.hhs.gov  Centers for Disease Control and Prevention (CDC): www.cdc.gov  Summary  Exercising regularly is important. It will improve your overall fitness, flexibility, and endurance.  Regular exercise will also improve your overall health. It can help you control your weight, reduce stress, and improve your bone density.  Do not exercise so much that you hurt yourself, feel dizzy, or get very short of breath.  Before you start a new exercise program, talk with your health care provider.  This information is not intended to replace advice given to you by your health care provider. Make sure you discuss any questions you have with  your health care provider.  Document Revised: 04/15/2022 Document Reviewed: 04/15/2022  Elsevier Patient Education © 2023 [employer] Fall Prevention in the Home, Adult  Falls can cause injuries and affect people of all ages. There are many simple things that you can do to make your home safe and to help prevent falls.  If you need it, ask for help making these changes.  What actions can I take to prevent falls?  General information  Use good lighting in all rooms. Make sure to:  Replace any light bulbs that burn out.  Turn on lights if it is dark and use night-lights.  Keep items that you use often in easy-to-reach places. Lower the shelves around your home if needed.  Move furniture so that there are clear paths around it.  Do not keep throw rugs or other things on the floor that can make you trip.  If any of your floors are uneven, fix them.  Add color or contrast paint or tape to clearly warren and help you see:  Grab bars or handrails.  First and last steps of staircases.  Where the edge of each step is.  If you use a ladder or stepladder:  Make sure that it is fully opened. Do not climb a closed ladder.  Make sure the sides of the ladder are locked in place.  Have someone hold the ladder while you use it.  Know where your pets are as you move through your home.  What can I do in the bathroom?         Keep the floor dry. Clean up any water that is on the floor right away.  Remove soap buildup in the bathtub or shower. Buildup makes bathtubs and showers slippery.  Use non-skid mats or decals on the floor of the bathtub or shower.  Attach bath mats securely with double-sided, non-slip rug tape.  If you need to sit down while you are in the shower, use a non-slip stool.  Install grab bars by the toilet and in the bathtub and shower. Do not use towel bars as grab bars.  What can I do in the bedroom?  Make sure that you have a light by your bed that is easy to reach.  Do not use any sheets or blankets on your bed  that hang to the floor.  Have a firm bench or chair with side arms that you can use for support when you get dressed.  What can I do in the kitchen?  Clean up any spills right away.  If you need to reach something above you, use a sturdy step stool that has a grab bar.  Keep electrical cables out of the way.  Do not use floor polish or wax that makes floors slippery.  What can I do with my stairs?  Do not leave anything on the stairs.  Make sure that you have a light switch at the top and the bottom of the stairs. Have them installed if you do not have them.  Make sure that there are handrails on both sides of the stairs. Fix handrails that are broken or loose. Make sure that handrails are as long as the staircases.  Install non-slip stair treads on all stairs in your home if they do not have carpet.  Avoid having throw rugs at the top or bottom of stairs, or secure the rugs with carpet tape to prevent them from moving.  Choose a carpet design that does not hide the edge of steps on the stairs. Make sure that carpet is firmly attached to the stairs. Fix any carpet that is loose or worn.  What can I do on the outside of my home?  Use bright outdoor lighting.  Repair the edges of walkways and driveways and fix any cracks. Clear paths of anything that can make you trip, such as tools or rocks.  Add color or contrast paint or tape to clearly warren and help you see high doorway thresholds.  Trim any bushes or trees on the main path into your home.  Check that handrails are securely fastened and in good repair. Both sides of all steps should have handrails.  Install guardrails along the edges of any raised decks or porches.  Have leaves, snow, and ice cleared regularly. Use sand, salt, or ice melt on walkways during winter months if you live where there is ice and snow.  In the garage, clean up any spills right away, including grease or oil spills.  What other actions can I take?  Review your medicines with your health  care provider. Some medicines can make you confused or feel dizzy. This can increase your chance of falling.  Wear closed-toe shoes that fit well and support your feet. Wear shoes that have rubber soles and low heels.  Use a cane, walker, scooter, or crutches that help you move around if needed.  Talk with your provider about other ways that you can decrease your risk of falls. This may include seeing a physical therapist to learn to do exercises to improve movement and strength.  Where to find more information  Centers for Disease Control and Prevention, [place]: cdc.gov  [employer]ging: kianna.nih.gov  [employer] on [employer]ging: kianna.nih.gov  Contact a health care provider if:  You are afraid of falling at home.  You feel weak, drowsy, or dizzy at home.  You fall at home.  Get help right away if you:  Lose consciousness or have trouble moving after a fall.  Have a fall that causes a head injury.  These symptoms may be an emergency. Get help right away. Call 911.  Do not wait to see if the symptoms will go away.  Do not drive yourself to the hospital.  This information is not intended to replace advice given to you by your health care provider. Make sure you discuss any questions you have with your health care provider.  Document Revised: 08/21/2023 Document Reviewed: 08/21/2023  Elsevier Patient Education © 2024 Else[employer] Inc[employer]

## 2025-03-31 ENCOUNTER — OFFICE VISIT (OUTPATIENT)
Dept: ENDOCRINOLOGY | Facility: CLINIC | Age: 75
End: 2025-03-31
Payer: MEDICARE

## 2025-03-31 VITALS
SYSTOLIC BLOOD PRESSURE: 132 MMHG | HEART RATE: 65 BPM | DIASTOLIC BLOOD PRESSURE: 72 MMHG | OXYGEN SATURATION: 98 % | HEIGHT: 64 IN | BODY MASS INDEX: 20.86 KG/M2 | WEIGHT: 122.2 LBS

## 2025-03-31 DIAGNOSIS — E78.2 MIXED HYPERLIPIDEMIA: Chronic | ICD-10-CM

## 2025-03-31 DIAGNOSIS — E13.9 DIABETES 1.5, MANAGED AS TYPE 1: Primary | ICD-10-CM

## 2025-03-31 DIAGNOSIS — E06.3 HYPOTHYROIDISM DUE TO HASHIMOTO'S THYROIDITIS: ICD-10-CM

## 2025-03-31 LAB
EXPIRATION DATE: ABNORMAL
EXPIRATION DATE: NORMAL
GLUCOSE BLDC GLUCOMTR-MCNC: 151 MG/DL (ref 70–130)
HBA1C MFR BLD: 5.3 % (ref 4.5–5.7)
Lab: ABNORMAL
Lab: NORMAL

## 2025-03-31 PROCEDURE — 3075F SYST BP GE 130 - 139MM HG: CPT | Performed by: INTERNAL MEDICINE

## 2025-03-31 PROCEDURE — 3078F DIAST BP <80 MM HG: CPT | Performed by: INTERNAL MEDICINE

## 2025-03-31 PROCEDURE — 83036 HEMOGLOBIN GLYCOSYLATED A1C: CPT | Performed by: INTERNAL MEDICINE

## 2025-03-31 PROCEDURE — 99213 OFFICE O/P EST LOW 20 MIN: CPT | Performed by: INTERNAL MEDICINE

## 2025-03-31 PROCEDURE — 82947 ASSAY GLUCOSE BLOOD QUANT: CPT | Performed by: INTERNAL MEDICINE

## 2025-03-31 PROCEDURE — 3044F HG A1C LEVEL LT 7.0%: CPT | Performed by: INTERNAL MEDICINE

## 2025-03-31 RX ORDER — LEVOTHYROXINE SODIUM 50 UG/1
50 TABLET ORAL DAILY
Qty: 90 TABLET | Refills: 3 | Status: SHIPPED | OUTPATIENT
Start: 2025-03-31 | End: 2025-03-31 | Stop reason: SDUPTHER

## 2025-03-31 RX ORDER — SIMVASTATIN 40 MG
40 TABLET ORAL
Qty: 90 TABLET | Refills: 3 | Status: SHIPPED | OUTPATIENT
Start: 2025-03-31

## 2025-03-31 RX ORDER — GLUCAGON 3 MG/1
3 POWDER NASAL ONCE AS NEEDED
Qty: 1 EACH | Refills: 1 | Status: SHIPPED | OUTPATIENT
Start: 2025-03-31

## 2025-03-31 RX ORDER — LEVOTHYROXINE SODIUM 50 UG/1
50 TABLET ORAL DAILY
Qty: 90 TABLET | Refills: 3 | Status: SHIPPED | OUTPATIENT
Start: 2025-03-31

## 2025-03-31 RX ORDER — INSULIN ASPART 100 [IU]/ML
INJECTION, SOLUTION INTRAVENOUS; SUBCUTANEOUS
Qty: 40 ML | Refills: 3 | Status: SHIPPED | OUTPATIENT
Start: 2025-03-31

## 2025-03-31 NOTE — PROGRESS NOTES
Chief Complaint   Patient presents with    Diabetes 1.5, managed as type 1       HPI:   July Sumner is a 74 y.o.female who returns to endocrine clinic for follow-up evaluation and management of her diabetes and hypothyroidism.  Last visit 12/30/2024. Her history is as follows:    Interim Events:   - Pt has been entering carbs consumed into the carb wizard function on her pump. Less hypoglycemia documented by using the the carb wizard feature and using temp basal function   - She has been entering most glucose readings into her pump  - pt has been having some low glucoses after exercise  - she has made adjustments to her basal rates    1) Type 1.5 DM (treated as type 1) with no known associated complications at this time:   - Diagnosed with DM at age 47 (1997) after presenting with weight loss. Found to have CHRISTOPHER.  - started insulin pump in approximately 2008.   - Current Pump: [employer] 770G w/o CGM. Replacement pump in 05/2023  - Previously managed by Endocrinology in [place], [place] (Dr. Parth Armando)    Current Insulin Pump Settings: (Novolog insulin)  Basal Rates: MN 0.400 u/hr, 5AM 0.600 u/hr, 7AM 0.650, 9PM 0.525 (total 13.875 units)  I:C Ratio: MN 1 unit : 15 gm  ISF:MN 1 unit : 50 > 100   BG Target:   Active Insulin Time: 3:30 hours    Glucometer/ Insulin Pump Review:  -  is using carb wizard for carbs consumed  - majority of pre-meal BGs < 120, occasional post-prandial hypoglycemia  - glucoses < 70 or lower occurring less frequently     Avg glucose: 99 +/- 22    Avg TDD: 22.7 units   Avg Basal: 13.8 units (61%)  Avg Bolus: 8.9 units (39%)  Carbs entered: 135 +/- 38 gm    DM Health Maintenance:  Ophtho: Last Visit (04/2024) [facility]- no retinopathy  Podiatry: No recent visit  Monofilament / Foot exam: DUE  Lipids: (03/2025) TChol 172, TG 42, HDL 97, LDL 66 - on simvastatin 40 mg q evening  Urine Microalb/Cr ratio: (03/2025) 13.4  - on lisinopril 5 mg for HTN  CBC: (03/2025)  Hgb 13.6  CMP: (03/2025) Cr 0.71, eGFR 88.8, LFTs WNL  Vit B12: (03/2025) 890 - on vit B12 supplement    - Have discussed the advantages of using the carb wizard function of the pump to lower the risk for hypoglycemia she had been experiencing when manually bolusing insulin for all foods consumed.Have reviewed the advantages of CGM devices and some of the new hybrid closed loop pumps available. Pt will not be eligible for a new pump through DME until 2028. She would be eligible for an omnipod 5 pump through her pharmacy benefits.   - Have reviewed that hypoglycemia is associated with multiple adverse consequences. In clinical trials and observational studies, severe hypoglycemia is associated with a roughly 1.5- to 6-fold increased risk of cardiovascular events and mortality.  In older adults, severe hypoglycemia has been associated with an increased risk of dementia.  - Have reviewed risks of developing hypoglycemic unawareness.     2) hypothyroidism due to hashimoto's thyroiditis:   - diagnosed in approx 2001    Current Dose: levothyroxine 50 mcg   - Takes in AM on an empty stomach  - no interfering factors  - denies missed doses  - is not on a high dose biotin supplement    Lab Results   Component Value Date    TSH 2.550 03/04/2025     Review of Systems   Constitutional: Negative.    HENT: Negative.     Eyes: Negative.    Respiratory: Negative.     Cardiovascular: Negative.    Gastrointestinal: Negative.    Endocrine:        See HPI re BGs   Genitourinary: Negative.    Musculoskeletal:  Positive for arthralgias (feet, hands).   Skin: Negative.    Allergic/Immunologic: Negative.    Neurological: Negative.    Hematological: Negative.    Psychiatric/Behavioral: Negative.         The following portions of the patient's history were reviewed and updated as appropriate: allergies, current medications, past family history, past medical history, past social history, past surgical history and problem list.      /72  "(BP Location: Left arm, Patient Position: Sitting, Cuff Size: Adult)   Pulse 65   Ht 163.4 cm (64.33\")   Wt 55.4 kg (122 lb 3.2 oz)   SpO2 98%   BMI 20.76 kg/m²   Physical Exam  Vitals reviewed.   Constitutional:       General: She is not in acute distress.     Appearance: She is well-developed. She is not diaphoretic.   HENT:      Head: Normocephalic.   Eyes:      Conjunctiva/sclera: Conjunctivae normal.      Pupils: Pupils are equal, round, and reactive to light.   Neck:      Thyroid: No thyromegaly.      Trachea: No tracheal deviation.      Comments: No palpable thyroid nodules    Cardiovascular:      Rate and Rhythm: Normal rate and regular rhythm.      Heart sounds: Normal heart sounds. No murmur heard.  Pulmonary:      Effort: Pulmonary effort is normal. No respiratory distress.      Breath sounds: Normal breath sounds.   Abdominal:      General: Bowel sounds are normal.      Palpations: Abdomen is soft. There is no mass.      Tenderness: There is no abdominal tenderness.      Comments: No scarring at catheter insertion sites     Lymphadenopathy:      Cervical: No cervical adenopathy.   Skin:     General: Skin is warm and dry.      Findings: No erythema.   Neurological:      Mental Status: She is alert and oriented to person, place, and time.      Cranial Nerves: No cranial nerve deficit.   Psychiatric:         Behavior: Behavior normal.       LABS/IMAGING: outside records reviewed and summarized in HPI  Results for orders placed or performed in visit on 03/31/25   POC Glycosylated Hemoglobin (Hb A1C)    Collection Time: 03/31/25  9:52 AM    Specimen: Blood   Result Value Ref Range    Hemoglobin A1C 5.3 4.5 - 5.7 %    Lot Number [id number]     Expiration Date 12/16/26    POC Glucose, Blood    Collection Time: 03/31/25  9:52 AM    Specimen: Blood   Result Value Ref Range    Glucose 151 (A) 70 - 130 mg/dL    Lot Number [id number]     Expiration Date 8/30/25        ASSESSMENT/PLAN:    1) Type 1.5 DM (treated " as type 1) with no known associated complications at this time: controlled, much less hypoglycemia after using carb wizard features, temp basal when walking.  A1C% 5.3 today.    Discussed trial of Temp basal at 60% for 2-3 hours after walk to see if this lessens her hypoglycemia after exercise.     Continue the following pump settings:  Basal Rates: MN 0.400 u/hr, 5AM 0.600 u/hr, 7AM 0.650, 9PM 0.525 (total 13.875 units)  I:C Ratio: MN 1 unit : 15 gm  ISF:MN 1 unit : 50 > 100   BG Target:   Active Insulin Time: 3:30 hours    2) hypothyroidism due to hashimoto's thyroiditis: controlled  TSH collected 03/04/25 was normal at 2.550  - continue levothyroxine 50 mcg qAM  - Reviewed proper thyroid hormone administration, and factors to avoid that decrease medication potency and medication absorption.     3) mixed hyperlipidemia: controlled  Lipids: (03/2025) TChol 172, TG 42, HDL 97, LDL 66 - on simvastatin 40 mg q evening    Ordered DM Health maintenance labs completed 03/2025 reviewed    RTC 3 months as required by Medicare.      Electronically Signed: Suzie Ledbetter MD

## 2025-07-02 ENCOUNTER — OFFICE VISIT (OUTPATIENT)
Dept: ENDOCRINOLOGY | Facility: CLINIC | Age: 75
End: 2025-07-02
Payer: MEDICARE

## 2025-07-02 VITALS
OXYGEN SATURATION: 98 % | HEART RATE: 71 BPM | HEIGHT: 64 IN | WEIGHT: 122 LBS | BODY MASS INDEX: 20.83 KG/M2 | DIASTOLIC BLOOD PRESSURE: 63 MMHG | SYSTOLIC BLOOD PRESSURE: 112 MMHG

## 2025-07-02 DIAGNOSIS — E06.3 HYPOTHYROIDISM DUE TO HASHIMOTO'S THYROIDITIS: ICD-10-CM

## 2025-07-02 DIAGNOSIS — E13.9 DIABETES 1.5, MANAGED AS TYPE 1: Primary | ICD-10-CM

## 2025-07-02 DIAGNOSIS — E78.2 MIXED HYPERLIPIDEMIA: ICD-10-CM

## 2025-07-02 LAB
EXPIRATION DATE: ABNORMAL
EXPIRATION DATE: NORMAL
GLUCOSE BLDC GLUCOMTR-MCNC: 171 MG/DL (ref 70–130)
HBA1C MFR BLD: 5.6 % (ref 4.5–5.7)
Lab: ABNORMAL
Lab: NORMAL

## 2025-07-04 NOTE — PROGRESS NOTES
Chief Complaint   Patient presents with    Diabetes 1.5     Follow up  Managed as type 1       HPI:   July Sumner is a 74 y.o.female who returns to endocrine clinic for follow-up evaluation and management of her diabetes and hypothyroidism.  Last visit 03/31/2025. Her history is as follows:    Interim Events:   - Pt has been entering carbs consumed into the carb wizard function on her pump. Much less hypoglycemia documented by using the the carb wizard feature and using temp basal function   - She has been entering glucose readings into her pump  - Is overall feeling well    1) Type 1.5 DM (treated as type 1) with no known associated complications at this time:   - Diagnosed with DM at age 47 (1997) after presenting with weight loss. Found to have CHRISTOPHER.  - started insulin pump in approximately 2008.   - Current Pump: [employer] 770G w/o CGM. Replacement pump in 05/2023  - Previously managed by Endocrinology in [place], [place] (Dr. Parth Armando)    Current Insulin Pump Settings: (Novolog insulin)  Basal Rates: MN 0.400 u/hr, 5AM 0.600 u/hr, 7AM 0.650, 9PM 0.525 (total 13.875 units)  I:C Ratio: MN 1 unit : 15 gm  ISF:MN 1 unit : 50 > 100   BG Target:   Active Insulin Time: 3:30 hours    Glucometer/ Insulin Pump Review:  -  is using carb wizard for carbs consumed  - majority of pre-meal BGs < 120, occasional post-prandial hypoglycemia  - glucoses < 70 or lower occurring less frequently     Avg glucose: 101 +/- 23 mg/dL    Avg TDD: 23.1 units   Avg Basal: 13.8 units (60%)  Avg Bolus: 9.3 units (40%)  Carbs entered: 137 +/- 37 gm    DM Health Maintenance:  Ophtho: Last Visit (05/14/2025) [facility]- no retinopathy  Podiatry: No recent visit  Monofilament / Foot exam: DUE  Lipids: (03/2025) TChol 172, TG 42, HDL 97, LDL 66 - on simvastatin 40 mg q evening  Urine Microalb/Cr ratio: (03/2025) 13.4  - on lisinopril 5 mg for HTN  CBC: (03/2025) Hgb 13.6  CMP: (03/2025) Cr 0.71, eGFR 88.8, LFTs  WNL  Vit B12: (03/2025) 890 - on vit B12 supplement    - Have discussed the advantages of using the carb wizard function of the pump to lower the risk for hypoglycemia she had been experiencing when manually bolusing insulin for all foods consumed.Have reviewed the advantages of CGM devices and some of the new hybrid closed loop pumps available. Pt will not be eligible for a new pump through DME until 2028. She would be eligible for an omnipod 5 pump through her pharmacy benefits.   - Have reviewed that hypoglycemia is associated with multiple adverse consequences. In clinical trials and observational studies, severe hypoglycemia is associated with a roughly 1.5- to 6-fold increased risk of cardiovascular events and mortality.  In older adults, severe hypoglycemia has been associated with an increased risk of dementia.  - Have reviewed risks of developing hypoglycemic unawareness.     2) hypothyroidism due to hashimoto's thyroiditis:   - diagnosed in approx 2001    Current Dose: levothyroxine 50 mcg   - Takes in AM on an empty stomach  - no interfering factors  - denies missed doses  - is not on a high dose biotin supplement    Lab Results   Component Value Date    TSH 2.550 03/04/2025     Review of Systems   Constitutional: Negative.    HENT: Negative.     Eyes: Negative.    Respiratory: Negative.     Cardiovascular: Negative.    Gastrointestinal: Negative.    Endocrine:        See HPI re BGs   Genitourinary: Negative.    Musculoskeletal:  Positive for arthralgias (feet, hands).   Skin: Negative.    Allergic/Immunologic: Negative.    Neurological: Negative.    Hematological: Negative.    Psychiatric/Behavioral: Negative.       The following portions of the patient's history were reviewed and updated as appropriate: allergies, current medications, past family history, past medical history, past social history, past surgical history and problem list.    /63 (BP Location: Left arm, Patient Position: Sitting,  "Cuff Size: Adult)   Pulse 71   Ht 162.6 cm (64\")   Wt 55.3 kg (122 lb)   SpO2 98%   BMI 20.94 kg/m²   Physical Exam  Vitals reviewed.   Constitutional:       General: She is not in acute distress.     Appearance: She is well-developed. She is not diaphoretic.   HENT:      Head: Normocephalic.   Eyes:      Conjunctiva/sclera: Conjunctivae normal.      Pupils: Pupils are equal, round, and reactive to light.   Neck:      Thyroid: No thyromegaly.      Trachea: No tracheal deviation.      Comments: No palpable thyroid nodules    Cardiovascular:      Rate and Rhythm: Normal rate and regular rhythm.      Heart sounds: Normal heart sounds. No murmur heard.  Pulmonary:      Effort: Pulmonary effort is normal. No respiratory distress.      Breath sounds: Normal breath sounds.   Abdominal:      General: Bowel sounds are normal.      Palpations: Abdomen is soft. There is no mass.      Tenderness: There is no abdominal tenderness.      Comments: No scarring at catheter insertion sites     Lymphadenopathy:      Cervical: No cervical adenopathy.   Skin:     General: Skin is warm and dry.      Findings: No erythema.   Neurological:      Mental Status: She is alert and oriented to person, place, and time.      Cranial Nerves: No cranial nerve deficit.   Psychiatric:         Behavior: Behavior normal.       LABS/IMAGING: outside records reviewed and summarized in HPI  Results for orders placed or performed in visit on 07/02/25   POC Glycosylated Hemoglobin (Hb A1C)    Collection Time: 07/02/25  9:45 AM    Specimen: Blood   Result Value Ref Range    Hemoglobin A1C 5.6 4.5 - 5.7 %    Lot Number [id number]     Expiration Date 03/14/2027    POC Glucose, Blood    Collection Time: 07/02/25  9:45 AM    Specimen: Blood   Result Value Ref Range    Glucose 171 (A) 70 - 130 mg/dL    Lot Number [id number]     Expiration Date 12/27/2025        ASSESSMENT/PLAN:    1) Type 1.5 DM (treated as type 1) with no known associated complications at " this time: controlled, much less hypoglycemia after using carb wizard features, temp basal when walking and at bedtime.  A1C% 5.6 today.    Continue the following pump settings:  Basal Rates: MN 0.400 u/hr, 5AM 0.600 u/hr, 7AM 0.650, 9PM 0.525 (total 13.875 units)  I:C Ratio: MN 1 unit : 15 gm  ISF:MN 1 unit : 50 > 100   BG Target:   Active Insulin Time: 3:30 hours    2) hypothyroidism due to hashimoto's thyroiditis: controlled  TSH collected 03/04/25 was normal at 2.550  - continue levothyroxine 50 mcg qAM  - Reviewed proper thyroid hormone administration, and factors to avoid that decrease medication potency and medication absorption.     3) mixed hyperlipidemia: controlled  Lipids: (03/2025) TChol 172, TG 42, HDL 97, LDL 66 - on simvastatin 40 mg q evening    Ordered DM Health maintenance labs completed 03/2025 reviewed    RTC 3 months as required by Medicare.      Electronically Signed: Suzie Ledbetter MD

## 2025-07-08 ENCOUNTER — PRIOR AUTHORIZATION (OUTPATIENT)
Dept: ENDOCRINOLOGY | Facility: CLINIC | Age: 75
End: 2025-07-08
Payer: MEDICARE

## 2025-07-09 DIAGNOSIS — E13.9 DIABETES 1.5, MANAGED AS TYPE 1: ICD-10-CM

## 2025-07-09 NOTE — TELEPHONE ENCOUNTER
July Sumner (Key: [id number])  PA Case ID #: [id number]  Rx #: [id number]  Need Help? Call us at [phone]  Outcome  Denied on July 8 by [employer] 2017 NCPDP  Request Reference Number: [id number]. ACCU-CHEK MAIRA GUIDE is denied for not meeting the prior authorization requirement(s). Details of this decision are in the notice attached below or have been faxed to you.  Drug  Accu-Chek Guide Test strips  ePA cloud logo  Form  [employer] Medicare Part D Electronic Prior Authorization Form (2017 NCPDP)  Original Claim Info  [id number],7X,76,19

## 2025-07-09 NOTE — TELEPHONE ENCOUNTER
Rx Refill Note  Requested Prescriptions     Pending Prescriptions Disp Refills    NovoLOG 100 UNIT/ML injection 40 mL 3     Sig: Use as directed per insulin pump up to 40 units per day      Last office visit with prescribing clinician: 7/2/2025      Next office visit with prescribing clinician: 10/6/2025   {  Renata Alegre MA  07/09/25, 11:54 EDT

## 2025-07-11 RX ORDER — INSULIN ASPART 100 [IU]/ML
INJECTION, SOLUTION INTRAVENOUS; SUBCUTANEOUS
Qty: 40 ML | Refills: 3 | Status: SHIPPED | OUTPATIENT
Start: 2025-07-11

## 2025-07-18 ENCOUNTER — TELEPHONE (OUTPATIENT)
Dept: ENDOCRINOLOGY | Facility: CLINIC | Age: 75
End: 2025-07-18
Payer: MEDICARE

## 2025-07-18 NOTE — TELEPHONE ENCOUNTER
"  Caller: July Sumner \"Jennifer Sumner\"    Relationship: Self    Best call back number:   Telephone Information:   Mobile [phone]     What was the call regarding: PT CALLED CHECKING STATUS OF FORM SENT BY [facility] , PT NEEDING FORM FAXED BACK TO PHARMACY FOR PT TEST STRIPS. PLEASE ADVISE.     "